# Patient Record
Sex: MALE | Race: WHITE | Employment: FULL TIME | URBAN - NONMETROPOLITAN AREA
[De-identification: names, ages, dates, MRNs, and addresses within clinical notes are randomized per-mention and may not be internally consistent; named-entity substitution may affect disease eponyms.]

---

## 2020-07-28 ENCOUNTER — HOSPITAL ENCOUNTER (EMERGENCY)
Age: 60
Discharge: HOME | End: 2020-07-28
Attending: EMERGENCY MEDICINE
Payer: COMMERCIAL

## 2020-07-28 VITALS
TEMPERATURE: 97.9 F | WEIGHT: 205 LBS | HEIGHT: 72 IN | DIASTOLIC BLOOD PRESSURE: 115 MMHG | RESPIRATION RATE: 18 BRPM | OXYGEN SATURATION: 98 % | HEART RATE: 77 BPM | BODY MASS INDEX: 27.77 KG/M2 | SYSTOLIC BLOOD PRESSURE: 153 MMHG

## 2020-07-28 PROCEDURE — 99282 EMERGENCY DEPT VISIT SF MDM: CPT

## 2020-07-28 PROCEDURE — 4500000027

## 2020-07-28 PROCEDURE — 2500000003 HC RX 250 WO HCPCS: Performed by: EMERGENCY MEDICINE

## 2020-07-28 RX ORDER — DEXLANSOPRAZOLE 60 MG/1
CAPSULE, DELAYED RELEASE ORAL
COMMUNITY

## 2020-07-28 RX ORDER — ICOSAPENT ETHYL 1000 MG/1
CAPSULE ORAL
COMMUNITY

## 2020-07-28 RX ORDER — TOBRAMYCIN 3 MG/ML
SOLUTION/ DROPS OPHTHALMIC
COMMUNITY

## 2020-07-28 RX ORDER — SULFAMETHOXAZOLE AND TRIMETHOPRIM 800; 160 MG/1; MG/1
TABLET ORAL
COMMUNITY

## 2020-07-28 RX ORDER — BUPROPION HYDROCHLORIDE 150 MG/1
TABLET ORAL
COMMUNITY

## 2020-07-28 RX ORDER — LIDOCAINE HYDROCHLORIDE 10 MG/ML
5 INJECTION, SOLUTION INFILTRATION; PERINEURAL ONCE
Status: COMPLETED | OUTPATIENT
Start: 2020-07-28 | End: 2020-07-28

## 2020-07-28 RX ADMIN — LIDOCAINE HYDROCHLORIDE 5 ML: 10 INJECTION, SOLUTION INFILTRATION; PERINEURAL at 19:54

## 2020-07-28 ASSESSMENT — ENCOUNTER SYMPTOMS
EYES NEGATIVE: 1
DIFFICULTY BREATHING: 0
GASTROINTESTINAL NEGATIVE: 1
NAUSEA: 0
BLURRED VISION: 0
DOUBLE VISION: 0
RESPIRATORY NEGATIVE: 1
VOMITING: 0

## 2020-07-28 ASSESSMENT — PAIN DESCRIPTION - PAIN TYPE: TYPE: ACUTE PAIN

## 2020-07-28 ASSESSMENT — PAIN SCALES - GENERAL: PAINLEVEL_OUTOF10: 5

## 2020-07-28 ASSESSMENT — PAIN DESCRIPTION - ORIENTATION: ORIENTATION: LEFT

## 2020-07-28 ASSESSMENT — PAIN DESCRIPTION - LOCATION: LOCATION: HEAD

## 2020-07-29 NOTE — ED PROVIDER NOTES
Susy rubio  The history is provided by the patient. Head Injury   Location:  Frontal  Mechanism of injury: direct blow    Mechanism of injury comment:  Weed eater fell onto his head while in garage and has laceration to forehead  Pain details:     Quality:  Aching and dull    Severity:  Mild    Timing:  Constant  Chronicity:  New  Relieved by:  Nothing  Worsened by:  Nothing  Ineffective treatments:  None tried  Associated symptoms: no blurred vision, no difficulty breathing, no disorientation, no double vision, no focal weakness, no headaches, no hearing loss, no loss of consciousness, no memory loss, no nausea, no neck pain, no numbness, no seizures, no tinnitus and no vomiting        Review of Systems   Constitutional: Negative. HENT: Negative. Negative for hearing loss and tinnitus. Eyes: Negative. Negative for blurred vision and double vision. Respiratory: Negative. Cardiovascular: Negative. Gastrointestinal: Negative. Negative for nausea and vomiting. Genitourinary: Negative. Musculoskeletal: Negative. Negative for neck pain. Skin: Negative. Neurological: Negative. Negative for focal weakness, seizures, loss of consciousness, numbness and headaches. Psychiatric/Behavioral: Negative for memory loss. All other systems reviewed and are negative. History reviewed. No pertinent family history. Social History     Socioeconomic History    Marital status: Single     Spouse name: Not on file    Number of children: Not on file    Years of education: Not on file    Highest education level: Not on file   Occupational History    Not on file   Social Needs    Financial resource strain: Not on file    Food insecurity     Worry: Not on file     Inability: Not on file    Transportation needs     Medical: Not on file     Non-medical: Not on file   Tobacco Use    Smoking status: Current Some Day Smoker   Substance and Sexual Activity    Alcohol use:  Yes    Drug use: No    Sexual activity: Not on file   Lifestyle    Physical activity     Days per week: Not on file     Minutes per session: Not on file    Stress: Not on file   Relationships    Social connections     Talks on phone: Not on file     Gets together: Not on file     Attends Shinto service: Not on file     Active member of club or organization: Not on file     Attends meetings of clubs or organizations: Not on file     Relationship status: Not on file    Intimate partner violence     Fear of current or ex partner: Not on file     Emotionally abused: Not on file     Physically abused: Not on file     Forced sexual activity: Not on file   Other Topics Concern    Not on file   Social History Narrative    Not on file     Past Surgical History:   Procedure Laterality Date    APPENDECTOMY      KNEE SURGERY       History reviewed. No pertinent past medical history. No Known Allergies  Prior to Admission medications    Medication Sig Start Date End Date Taking? Authorizing Provider   buPROPion (WELLBUTRIN XL) 150 MG extended release tablet bupropion HCl  mg 24 hr tablet, extended release    Historical Provider, MD   Icosapent Ethyl (VASCEPA) 1 g CAPS capsule Vascepa 1 gram capsule    Historical Provider, MD   dexlansoprazole (DEXILANT) 60 MG CPDR delayed release capsule Dexilant 60 mg capsule, delayed release   TAKE 1 CAPSULE BY MOUTH EVERY DAY FOR 64 DAYS    Historical Provider, MD   sulfamethoxazole-trimethoprim (BACTRIM DS;SEPTRA DS) 800-160 MG per tablet sulfamethoxazole 800 mg-trimethoprim 160 mg tablet    Historical Provider, MD   tobramycin (TOBREX) 0.3 % ophthalmic solution Tobrex 0.3 % eye drops   instill 2 drops in affected eyes three x day    Historical Provider, MD   ibuprofen (ADVIL;MOTRIN) 600 MG tablet Take 600 mg by mouth every 6 hours as needed for Pain. Historical Provider, MD   naproxen (NAPROSYN) 500 MG tablet Take 1 tablet by mouth 2 times daily as needed for Pain for up to 10 days.  2/4/15 2/14/15 Juliette Reed MD       BP (!) 153/115   Pulse 77   Temp 97.9 °F (36.6 °C) (Temporal)   Resp 18   Ht 6' (1.829 m)   Wt 205 lb (93 kg)   SpO2 98%   BMI 27.80 kg/m²     Physical Exam  Vitals signs and nursing note reviewed. Constitutional:       Appearance: He is well-developed. HENT:      Head: Normocephalic and atraumatic. Comments: Red is three lacerations (see procedure note for size details)     Right Ear: External ear normal.      Left Ear: External ear normal.      Nose: Nose normal.   Eyes:      Conjunctiva/sclera: Conjunctivae normal.      Pupils: Pupils are equal, round, and reactive to light. Neck:      Musculoskeletal: Normal range of motion and neck supple. Cardiovascular:      Rate and Rhythm: Normal rate and regular rhythm. Heart sounds: Normal heart sounds. Pulmonary:      Effort: Pulmonary effort is normal.      Breath sounds: Normal breath sounds. Abdominal:      General: Bowel sounds are normal.      Palpations: Abdomen is soft. Musculoskeletal: Normal range of motion. Skin:     General: Skin is warm and dry. Neurological:      General: No focal deficit present. Mental Status: He is alert and oriented to person, place, and time. GCS: GCS eye subscore is 4. GCS verbal subscore is 5. GCS motor subscore is 6. Cranial Nerves: Cranial nerves are intact. Sensory: Sensation is intact. Motor: Motor function is intact. Coordination: Coordination is intact. Psychiatric:         Behavior: Behavior normal.         Thought Content: Thought content normal.         Judgment: Judgment normal.         MDM:    No results found for this visit on 07/28/20.     [facility]    Date/Time: 7/28/2020 8:32 PM  Performed by: Gilmar Tomas DO  Authorized by: Gilmar Tomas DO     Consent:     Consent obtained:  Verbal    Consent given by:  Patient    Risks discussed:  Infection, need for additional repair, nerve damage, poor wound healing, poor cosmetic result, pain, retained foreign body, tendon damage and vascular damage    Alternatives discussed:  No treatment, delayed treatment, observation and referral  Universal protocol:     Procedure explained and questions answered to patient or proxy's satisfaction: yes      Relevant documents present and verified: yes      Test results available and properly labeled: yes      Imaging studies available: yes      Required blood products, implants, devices, and special equipment available: yes      Site/side marked: yes      Immediately prior to procedure, a time out was called: yes      Patient identity confirmed:  Verbally with patient  Anesthesia (see MAR for exact dosages): Anesthesia method:  Local infiltration    Local anesthetic:  Lidocaine 1% w/o epi  Laceration details:     Location:  Face    Face location:  Forehead    Wound length (cm): 2 cm above left eye brow, 1.5 cm between eyes, 1cm above right eyebrow. Repair type:     Repair type:  Simple  Pre-procedure details:     Preparation:  Patient was prepped and draped in usual sterile fashion  Exploration:     Hemostasis achieved with:  Direct pressure    Wound exploration: entire depth of wound probed and visualized      Contaminated: no    Treatment:     Area cleansed with:  Remigio-Jason    Amount of cleaning:  Extensive    Irrigation solution:  Sterile saline    Irrigation method:  Pressure wash    Visualized foreign bodies/material removed: no    Skin repair:     Repair method:  Sutures    Suture size:  6-0    Suture material:  Fast-absorbing gut    Suture technique:  Running    Number of sutures: All 3 lacerations were closed with same method and type of suture. Steristrips placed after for extra support. Post-procedure details:     Dressing:  Non-adherent dressing    Patient tolerance of procedure:   Tolerated well, no immediate complications          My typical dicussion, presentation,and considerations for this patients' chief complaint,

## 2020-07-29 NOTE — ED NOTES
Patient given AVS, and discharge instructions. Verbalizes understanding no further needs identified at this time.      Aleksandr Patterson RN  07/28/20 2027

## 2022-12-13 ENCOUNTER — HOSPITAL ENCOUNTER (OUTPATIENT)
Age: 62
Discharge: HOME | End: 2022-12-13
Payer: COMMERCIAL

## 2022-12-13 ENCOUNTER — HOSPITAL ENCOUNTER (OUTPATIENT)
Dept: GENERAL RADIOLOGY | Age: 62
Discharge: HOME | End: 2022-12-13
Payer: COMMERCIAL

## 2022-12-13 DIAGNOSIS — J20.9 ACUTE BRONCHITIS, UNSPECIFIED ORGANISM: ICD-10-CM

## 2022-12-13 PROCEDURE — 71046 X-RAY EXAM CHEST 2 VIEWS: CPT

## 2023-01-25 ENCOUNTER — APPOINTMENT (OUTPATIENT)
Dept: GENERAL RADIOLOGY | Age: 63
End: 2023-01-25
Payer: COMMERCIAL

## 2023-01-25 ENCOUNTER — HOSPITAL ENCOUNTER (EMERGENCY)
Age: 63
Discharge: ANOTHER HOSPITAL | End: 2023-01-26
Attending: EMERGENCY MEDICINE
Payer: COMMERCIAL

## 2023-01-25 ENCOUNTER — APPOINTMENT (OUTPATIENT)
Dept: CT IMAGING | Age: 63
End: 2023-01-25
Payer: COMMERCIAL

## 2023-01-25 DIAGNOSIS — E83.42 HYPOMAGNESEMIA: ICD-10-CM

## 2023-01-25 DIAGNOSIS — R50.9 ACUTE FEBRILE ILLNESS: ICD-10-CM

## 2023-01-25 DIAGNOSIS — R07.9 CHEST PAIN, UNSPECIFIED TYPE: Primary | ICD-10-CM

## 2023-01-25 DIAGNOSIS — D69.6 THROMBOCYTOPENIA (HCC): ICD-10-CM

## 2023-01-25 DIAGNOSIS — I21.4 NSTEMI (NON-ST ELEVATED MYOCARDIAL INFARCTION) (HCC): ICD-10-CM

## 2023-01-25 LAB
ADENOVIRUS DETECTION BY PCR: NOT DETECTED
ALBUMIN SERPL-MCNC: 4 GM/DL (ref 3.4–5)
ALP BLD-CCNC: 88 IU/L (ref 40–129)
ALT SERPL-CCNC: 12 U/L (ref 10–40)
ANION GAP SERPL CALCULATED.3IONS-SCNC: 14 MMOL/L (ref 4–16)
AST SERPL-CCNC: 20 IU/L (ref 15–37)
BANDED NEUTROPHILS ABSOLUTE COUNT: 0.2 K/CU MM
BANDED NEUTROPHILS RELATIVE PERCENT: 3 % (ref 5–11)
BILIRUB SERPL-MCNC: 0.8 MG/DL (ref 0–1)
BORDETELLA PARAPERTUSSIS BY PCR: NOT DETECTED
BORDETELLA PERTUSSIS PCR: NOT DETECTED
BUN BLDV-MCNC: 17 MG/DL (ref 6–23)
CALCIUM SERPL-MCNC: 9.4 MG/DL (ref 8.3–10.6)
CHLAMYDOPHILA PNEUMONIA PCR: NOT DETECTED
CHLORIDE BLD-SCNC: 96 MMOL/L (ref 99–110)
CO2: 24 MMOL/L (ref 21–32)
CORONAVIRUS 229E PCR: NOT DETECTED
CORONAVIRUS HKU1 PCR: NOT DETECTED
CORONAVIRUS NL63 PCR: NOT DETECTED
CORONAVIRUS OC43 PCR: NOT DETECTED
CREAT SERPL-MCNC: 1 MG/DL (ref 0.9–1.3)
DIFFERENTIAL TYPE: ABNORMAL
GFR SERPL CREATININE-BSD FRML MDRD: >60 ML/MIN/1.73M2
GLUCOSE BLD-MCNC: 101 MG/DL (ref 70–99)
HCT VFR BLD CALC: 29 % (ref 42–52)
HEMOGLOBIN: 9.5 GM/DL (ref 13.5–18)
HUMAN METAPNEUMOVIRUS PCR: NOT DETECTED
INFLUENZA A BY PCR: NOT DETECTED
INFLUENZA A H1 (2009) PCR: NOT DETECTED
INFLUENZA A H1 PANDEMIC PCR: NOT DETECTED
INFLUENZA A H3 PCR: NOT DETECTED
INFLUENZA B BY PCR: NOT DETECTED
LACTIC ACID, SEPSIS: 1.3 MMOL/L (ref 0.5–1.9)
LYMPHOCYTES ABSOLUTE: 2.4 K/CU MM
LYMPHOCYTES RELATIVE PERCENT: 36 % (ref 24–44)
MAGNESIUM: 1.6 MG/DL (ref 1.8–2.4)
MCH RBC QN AUTO: 30.1 PG (ref 27–31)
MCHC RBC AUTO-ENTMCNC: 32.8 % (ref 32–36)
MCV RBC AUTO: 91.8 FL (ref 78–100)
METAMYELOCYTES ABSOLUTE COUNT: 0.13 K/CU MM
METAMYELOCYTES PERCENT: 2 %
MONOCYTES ABSOLUTE: 3.4 K/CU MM
MONOCYTES RELATIVE PERCENT: 50 % (ref 0–4)
MYCOPLASMA PNEUMONIAE PCR: NOT DETECTED
MYELOCYTE PERCENT: 3 %
MYELOCYTES ABSOLUTE COUNT: 0.2 K/CU MM
OVALOCYTES: ABNORMAL
PARAINFLUENZA 1 PCR: NOT DETECTED
PARAINFLUENZA 2 PCR: NOT DETECTED
PARAINFLUENZA 3 PCR: NOT DETECTED
PARAINFLUENZA 4 PCR: NOT DETECTED
PDW BLD-RTO: 17.9 % (ref 11.7–14.9)
PLATELET # BLD: 14 K/CU MM (ref 140–440)
PMV BLD AUTO: ABNORMAL FL (ref 7.5–11.1)
POTASSIUM SERPL-SCNC: 4.1 MMOL/L (ref 3.5–5.1)
PRO-BNP: 1459 PG/ML
RBC # BLD: 3.16 M/CU MM (ref 4.6–6.2)
RHINOVIRUS ENTEROVIRUS PCR: NOT DETECTED
RSV PCR: NOT DETECTED
SARS-COV-2: NOT DETECTED
SCHISTOCYTES: ABNORMAL
SEGMENTED NEUTROPHILS ABSOLUTE COUNT: 0.4 K/CU MM
SEGMENTED NEUTROPHILS RELATIVE PERCENT: 6 % (ref 36–66)
SODIUM BLD-SCNC: 134 MMOL/L (ref 135–145)
TARGET CELLS: ABNORMAL
TEAR DROP CELLS: ABNORMAL
TOTAL PROTEIN: 7.3 GM/DL (ref 6.4–8.2)
TROPONIN T: 0.04 NG/ML
TROPONIN T: 0.06 NG/ML
TSH HIGH SENSITIVITY: 0.69 UIU/ML (ref 0.27–4.2)
WBC # BLD: 6.7 K/CU MM (ref 4–10.5)

## 2023-01-25 PROCEDURE — 99285 EMERGENCY DEPT VISIT HI MDM: CPT

## 2023-01-25 PROCEDURE — 71045 X-RAY EXAM CHEST 1 VIEW: CPT

## 2023-01-25 PROCEDURE — 93005 ELECTROCARDIOGRAM TRACING: CPT | Performed by: EMERGENCY MEDICINE

## 2023-01-25 PROCEDURE — 6360000004 HC RX CONTRAST MEDICATION: Performed by: EMERGENCY MEDICINE

## 2023-01-25 PROCEDURE — 0202U NFCT DS 22 TRGT SARS-COV-2: CPT

## 2023-01-25 PROCEDURE — 2580000003 HC RX 258: Performed by: EMERGENCY MEDICINE

## 2023-01-25 PROCEDURE — 87040 BLOOD CULTURE FOR BACTERIA: CPT

## 2023-01-25 PROCEDURE — 6360000002 HC RX W HCPCS: Performed by: EMERGENCY MEDICINE

## 2023-01-25 PROCEDURE — 84443 ASSAY THYROID STIM HORMONE: CPT

## 2023-01-25 PROCEDURE — 6370000000 HC RX 637 (ALT 250 FOR IP): Performed by: EMERGENCY MEDICINE

## 2023-01-25 PROCEDURE — 84484 ASSAY OF TROPONIN QUANT: CPT

## 2023-01-25 PROCEDURE — 83605 ASSAY OF LACTIC ACID: CPT

## 2023-01-25 PROCEDURE — 80053 COMPREHEN METABOLIC PANEL: CPT

## 2023-01-25 PROCEDURE — 96376 TX/PRO/DX INJ SAME DRUG ADON: CPT

## 2023-01-25 PROCEDURE — 71275 CT ANGIOGRAPHY CHEST: CPT

## 2023-01-25 PROCEDURE — 83735 ASSAY OF MAGNESIUM: CPT

## 2023-01-25 PROCEDURE — 85027 COMPLETE CBC AUTOMATED: CPT

## 2023-01-25 PROCEDURE — 83880 ASSAY OF NATRIURETIC PEPTIDE: CPT

## 2023-01-25 PROCEDURE — 96365 THER/PROPH/DIAG IV INF INIT: CPT

## 2023-01-25 PROCEDURE — 85007 BL SMEAR W/DIFF WBC COUNT: CPT

## 2023-01-25 PROCEDURE — 96361 HYDRATE IV INFUSION ADD-ON: CPT

## 2023-01-25 RX ORDER — KETOROLAC TROMETHAMINE 15 MG/ML
INJECTION, SOLUTION INTRAMUSCULAR; INTRAVENOUS
Status: DISCONTINUED
Start: 2023-01-25 | End: 2023-01-26 | Stop reason: HOSPADM

## 2023-01-25 RX ORDER — ONDANSETRON 2 MG/ML
4 INJECTION INTRAMUSCULAR; INTRAVENOUS EVERY 30 MIN PRN
Status: DISCONTINUED | OUTPATIENT
Start: 2023-01-25 | End: 2023-01-26 | Stop reason: HOSPADM

## 2023-01-25 RX ORDER — ACETAMINOPHEN 500 MG
1000 TABLET ORAL ONCE
Status: COMPLETED | OUTPATIENT
Start: 2023-01-25 | End: 2023-01-25

## 2023-01-25 RX ORDER — MAGNESIUM SULFATE IN WATER 40 MG/ML
2000 INJECTION, SOLUTION INTRAVENOUS ONCE
Status: COMPLETED | OUTPATIENT
Start: 2023-01-25 | End: 2023-01-25

## 2023-01-25 RX ORDER — OMEPRAZOLE 20 MG/1
CAPSULE, DELAYED RELEASE ORAL
COMMUNITY
Start: 2023-01-11

## 2023-01-25 RX ORDER — NITROGLYCERIN 0.4 MG/1
TABLET SUBLINGUAL
Status: DISCONTINUED
Start: 2023-01-25 | End: 2023-01-26 | Stop reason: HOSPADM

## 2023-01-25 RX ORDER — OMEGA-3-ACID ETHYL ESTERS 1 G/1
CAPSULE, LIQUID FILLED ORAL
COMMUNITY
Start: 2023-01-12

## 2023-01-25 RX ORDER — 0.9 % SODIUM CHLORIDE 0.9 %
1000 INTRAVENOUS SOLUTION INTRAVENOUS ONCE
Status: COMPLETED | OUTPATIENT
Start: 2023-01-25 | End: 2023-01-25

## 2023-01-25 RX ORDER — ASPIRIN 81 MG/1
324 TABLET, CHEWABLE ORAL ONCE
Status: COMPLETED | OUTPATIENT
Start: 2023-01-25 | End: 2023-01-25

## 2023-01-25 RX ORDER — MORPHINE SULFATE 4 MG/ML
4 INJECTION, SOLUTION INTRAMUSCULAR; INTRAVENOUS EVERY 30 MIN PRN
Status: DISCONTINUED | OUTPATIENT
Start: 2023-01-25 | End: 2023-01-26 | Stop reason: HOSPADM

## 2023-01-25 RX ORDER — NITROGLYCERIN 0.4 MG/1
0.4 TABLET SUBLINGUAL EVERY 5 MIN PRN
Status: DISCONTINUED | OUTPATIENT
Start: 2023-01-25 | End: 2023-01-26 | Stop reason: HOSPADM

## 2023-01-25 RX ORDER — KETOROLAC TROMETHAMINE 15 MG/ML
15 INJECTION, SOLUTION INTRAMUSCULAR; INTRAVENOUS ONCE
Status: COMPLETED | OUTPATIENT
Start: 2023-01-25 | End: 2023-01-25

## 2023-01-25 RX ORDER — ACETAMINOPHEN 500 MG
TABLET ORAL
Status: DISCONTINUED
Start: 2023-01-25 | End: 2023-01-26 | Stop reason: HOSPADM

## 2023-01-25 RX ORDER — SODIUM CHLORIDE 9 MG/ML
INJECTION, SOLUTION INTRAVENOUS CONTINUOUS
Status: DISCONTINUED | OUTPATIENT
Start: 2023-01-25 | End: 2023-01-26 | Stop reason: HOSPADM

## 2023-01-25 RX ADMIN — IOPAMIDOL 75 ML: 755 INJECTION, SOLUTION INTRAVENOUS at 20:19

## 2023-01-25 RX ADMIN — NITROGLYCERIN 0.4 MG: 0.4 TABLET SUBLINGUAL at 18:52

## 2023-01-25 RX ADMIN — SODIUM CHLORIDE 1000 ML: 9 INJECTION, SOLUTION INTRAVENOUS at 19:43

## 2023-01-25 RX ADMIN — ACETAMINOPHEN 1000 MG: 500 TABLET ORAL at 18:51

## 2023-01-25 RX ADMIN — KETOROLAC TROMETHAMINE 15 MG: 15 INJECTION, SOLUTION INTRAMUSCULAR; INTRAVENOUS at 18:53

## 2023-01-25 RX ADMIN — SODIUM CHLORIDE: 9 INJECTION, SOLUTION INTRAVENOUS at 23:16

## 2023-01-25 RX ADMIN — ASPIRIN 81 MG CHEWABLE TABLET 324 MG: 81 TABLET CHEWABLE at 23:08

## 2023-01-25 RX ADMIN — MAGNESIUM SULFATE HEPTAHYDRATE 2000 MG: 40 INJECTION, SOLUTION INTRAVENOUS at 19:47

## 2023-01-25 ASSESSMENT — PAIN - FUNCTIONAL ASSESSMENT
PAIN_FUNCTIONAL_ASSESSMENT: 0-10
PAIN_FUNCTIONAL_ASSESSMENT: PREVENTS OR INTERFERES WITH MANY ACTIVE NOT PASSIVE ACTIVITIES

## 2023-01-25 ASSESSMENT — PAIN SCALES - GENERAL
PAINLEVEL_OUTOF10: 10
PAINLEVEL_OUTOF10: 9
PAINLEVEL_OUTOF10: 10
PAINLEVEL_OUTOF10: 2
PAINLEVEL_OUTOF10: 0

## 2023-01-25 ASSESSMENT — PAIN DESCRIPTION - DESCRIPTORS: DESCRIPTORS: PRESSURE

## 2023-01-25 ASSESSMENT — PAIN DESCRIPTION - LOCATION
LOCATION: CHEST

## 2023-01-25 ASSESSMENT — PAIN DESCRIPTION - PAIN TYPE: TYPE: ACUTE PAIN

## 2023-01-25 ASSESSMENT — PAIN DESCRIPTION - ORIENTATION
ORIENTATION: MID
ORIENTATION: MID

## 2023-01-25 ASSESSMENT — PAIN DESCRIPTION - FREQUENCY: FREQUENCY: CONTINUOUS

## 2023-01-25 NOTE — ED PROVIDER NOTES
Emergency Department Encounter  Location: [facility] [address] [place] [address]    Patient: Ronnell Chaidez  MRN: [id number]  : 1960  Date of evaluation: 2023  ED Provider: Manda Barragan DO, FACEP    Chief Complaint:    Fever ( has had pressure in his chest since this morning. States pain is getting progressively worse. States took ibuprofen at 0730 this morning. States has been nauseated and vomiting once. States has been very SOB. ) and Chest Pain    [place]:  Ronnell Chaidez is a 58 y.o. male that presents to the emergency department with complaints of pressure in his chest that started this morning when he awoke. He states that has been getting progressively worse through the day. He took ibuprofen at 7:30 AM.  He has been nauseated and has vomited once. He has been short of breath associated with it. He denies headache. He is having pain in the center of his chest that seems to be worsening as the day is gone on. He left work early and went home. He states when he got home he laid down and took a nap. When he woke up about 430 the pain was twice as bad as it was previously. Patient has no previous history of heart problems. He states he has a family history of cardiac disease. He is a non-smoker with no history of diabetes hypertension or high cholesterol. Upon arrival patient has a temperature 1-2.4 he was not aware. He denies cough. He denies diarrhea. ROS - see HPI, below listed is current ROS at time of my eval:  At least 10 systems reviewed and otherwise acutely negative except as in the [address]   General:  No fevers, no chills, no weakness  Eyes:  No recent vison changes, no discharge  ENT:  No sore throat, no nasal congestion, no hearing changes  Cardiovascular: Positive for chest pain, no palpitations  Respiratory: Positive for shortness of breath, no cough, no wheezing  Gastrointestinal:  No pain, positive for nausea 1 episode of vomiting, no diarrhea  Musculoskeletal:  No muscle pain, no joint pain  Skin:  No rash, no pruritis, no easy bruising  Neurologic:  No speech problems, no headache, no extremity numbness, no extremity tingling, no extremity weakness  Psychiatric:  No anxiety  Genitourinary:  No dysuria, no hematuria  Endocrine:  No unexpected weight gain, no unexpected weight loss  Extremities:  no edema, no pain    History reviewed. No pertinent past medical history. Past Surgical History:   Procedure Laterality Date    ANKLE SURGERY Left     APPENDECTOMY      KNEE SURGERY       History reviewed. No pertinent family history. Social History     Socioeconomic History    Marital status: Single     Spouse name: Not on file    Number of children: Not on file    Years of education: Not on file    Highest education level: Not on file   Occupational History    Not on file   Tobacco Use    Smoking status: Former     Types: Cigarettes    Smokeless tobacco: Not on file   Vaping Use    Vaping Use: Never used   Substance and Sexual Activity    Alcohol use:  Yes     Alcohol/week: 4.0 standard drinks     Types: 2 Glasses of wine, 2 Shots of liquor per week     Comment: Weekends    Drug use: No    Sexual activity: Not on file   Other Topics Concern    Not on file   Social History Narrative    Not on file     Social Determinants of Health     Financial Resource Strain: Not on file   Food Insecurity: Not on file   Transportation Needs: Not on file   Physical Activity: Not on file   Stress: Not on file   Social Connections: Not on file   Intimate Partner Violence: Not on file   Housing Stability: Not on file     Current Facility-Administered Medications   Medication Dose Route Frequency Provider Last Rate Last Admin    nitroGLYCERIN (NITROSTAT) SL tablet 0.4 mg  0.4 mg SubLINGual Q5 Min PRN Goyo Martínez DO   0.4 mg at 01/25/23 4902    ondansetron (ZOFRAN) injection 4 mg  4 mg IntraVENous Q30 Min PRN Goyo Martínez DO        morphine sulfate (PF) injection 4 mg  4 mg IntraVENous Q30 Min PRN Austin Oreilly, DO        0.9 % sodium chloride infusion   IntraVENous Continuous Austin Oreilly, DO        aspirin chewable tablet 324 mg  324 mg Oral Once Austin Oreilly, DO         Current Outpatient Medications   Medication Sig Dispense Refill    omega-3 acid ethyl esters (LOVAZA) 1 g capsule TAKE 2 CAPSULES BY MOUTH TWICE A DAY      omeprazole (PRILOSEC) 20 MG delayed release capsule TAKE 1 CAPSULE BY MOUTH EVERY DAY      buPROPion (WELLBUTRIN XL) 150 MG extended release tablet bupropion HCl  mg 24 hr tablet, extended release (Patient not taking: Reported on 1/25/2023)      Icosapent Ethyl (VASCEPA) 1 g CAPS capsule Vascepa 1 gram capsule      dexlansoprazole (DEXILANT) 60 MG CPDR delayed release capsule Dexilant 60 mg capsule, delayed release   TAKE 1 CAPSULE BY MOUTH EVERY DAY FOR 56 DAYS (Patient not taking: Reported on 1/25/2023)      sulfamethoxazole-trimethoprim (BACTRIM DS;SEPTRA DS) 800-160 MG per tablet sulfamethoxazole 800 mg-trimethoprim 160 mg tablet (Patient not taking: Reported on 1/25/2023)      tobramycin (TOBREX) 0.3 % ophthalmic solution Tobrex 0.3 % eye drops   instill 2 drops in affected eyes three x day (Patient not taking: Reported on 1/25/2023)      ibuprofen (ADVIL;MOTRIN) 600 MG tablet Take 600 mg by mouth every 6 hours as needed for Pain. naproxen (NAPROSYN) 500 MG tablet Take 1 tablet by mouth 2 times daily as needed for Pain for up to 10 days. 20 tablet 0     No Known Allergies    Nursing Notes Reviewed    Physical Exam:  ED Triage Vitals [01/25/23 1807]   Enc Vitals Group      BP (!) 150/75      Heart Rate (!) 133      Resp 22      Temp (!) 102.4 °F (39.1 °C)      Temp Source Oral      SpO2 97 %      Weight 201 lb (91.2 kg)      Height 6' (1.829 m)      Head Circumference       Peak Flow       Pain Score       Pain Loc       Pain Edu? Excl. in [address]? GENERAL APPEARANCE: Awake and alert. Cooperative. No acute distress. Nontoxic in appearance  HEAD: Normocephalic. Atraumatic. EYES: EOM's grossly intact. Sclera anicteric. ENT: Tolerates saliva. No trismus. NECK: Supple. Trachea midline. CARDIO: RRR. Radial pulse 2+. Chest wall is tender to palpation in the substernal region. LUNGS: Respirations unlabored. CTAB. Deep breaths make the pain worse. No wheezes rales or rhonchi  ABDOMEN: Soft. Non-distended. Non-tender. EXTREMITIES: No acute deformities. No edema in lower extremities no pain to his calves  SKIN: Warm and dry. NEUROLOGICAL: No gross facial drooping. Moves all 4 extremities spontaneously. PSYCHIATRIC: Normal mood.      Labs:  Results for orders placed or performed during the hospital encounter of 01/25/23   Respiratory Panel, Molecular, with COVID-19 (Restricted: peds pts or suitable admitted adults)    Specimen: Nasopharyngeal   Result Value Ref Range    Adenovirus Detection by PCR NOT DETECTED NOT DETECTED    Coronavirus 229E PCR NOT DETECTED NOT DETECTED    Coronavirus HKU1 PCR NOT DETECTED NOT DETECTED    Coronavirus NL63 PCR NOT DETECTED NOT DETECTED    Coronavirus OC43 PCR NOT DETECTED NOT DETECTED    SARS-CoV-2 NOT DETECTED NOT DETECTED    Human Metapneumovirus PCR NOT DETECTED NOT DETECTED    Rhinovirus Enterovirus PCR NOT DETECTED NOT DETECTED    Influenza A by PCR NOT DETECTED NOT DETECTED    Influenza A H1 Pandemic PCR NOT DETECTED NOT DETECTED    Influenza A H1 (2009) PCR NOT DETECTED NOT DETECTED    Influenza A H3 PCR NOT DETECTED NOT DETECTED    Influenza B by PCR NOT DETECTED NOT DETECTED    Parainfluenza 1 PCR NOT DETECTED NOT DETECTED    Parainfluenza 2 PCR NOT DETECTED NOT DETECTED    Parainfluenza 3 PCR NOT DETECTED NOT DETECTED    Parainfluenza 4 PCR NOT DETECTED NOT DETECTED    RSV PCR NOT DETECTED NOT DETECTED    Bordetella parapertussis by PCR NOT DETECTED NOT DETECTED    B Pertussis by PCR NOT DETECTED NOT DETECTED    Chlamydophila Pneumonia PCR NOT DETECTED NOT DETECTED    Mycoplasma pneumo by PCR NOT DETECTED NOT DETECTED Brain Natriuretic Peptide   Result Value Ref Range    Pro-BNP 1,459 (H) <300 PG/ML   CBC with Auto Differential   Result Value Ref Range    WBC 6.7 4.0 - 10.5 K/CU MM    RBC 3.16 (L) 4.6 - 6.2 M/CU MM    Hemoglobin 9.5 (L) 13.5 - 18.0 GM/DL    Hematocrit 29.0 (L) 42 - 52 %    MCV 91.8 78 - 100 FL    MCH 30.1 27 - 31 PG    MCHC 32.8 32.0 - 36.0 %    RDW 17.9 (H) 11.7 - 14.9 %    Platelets 14 (LL) 208 - 440 K/CU MM    MPV ABNORMAL 7.5 - 11.1 FL    Myelocyte Percent 3 (H) 0.0 %    Metamyelocytes Relative 2 (H) 0.0 %    Bands Relative 3 (L) 5 - 11 %    Segs Relative 6.0 (L) 36 - 66 %    Lymphocytes % 36.0 24 - 44 %    Monocytes % 50.0 (H) 0 - 4 %    Myelocytes Absolute 0.20 K/CU MM    Metamyelocytes Absolute 0.13 K/CU MM    Bands Absolute 0.20 K/CU MM    Segs Absolute 0.4 K/CU MM    Lymphocytes Absolute 2.4 K/CU MM    Monocytes Absolute 3.4 K/CU MM    Differential Type MANUAL DIFFERENTIAL     Target Cells 1+     Tear Drop Cells 1+     Ovalocytes 2+     Schistocytes 1+    Comprehensive Metabolic Panel   Result Value Ref Range    Sodium 134 (L) 135 - 145 MMOL/L    Potassium 4.1 3.5 - 5.1 MMOL/L    Chloride 96 (L) 99 - 110 mMol/L    CO2 24 21 - 32 MMOL/L    BUN 17 6 - 23 MG/DL    Creatinine 1.0 0.9 - 1.3 MG/DL    Est, Glom Filt Rate >60 >60 mL/min/1.73m2    Glucose 101 (H) 70 - 99 MG/DL    Calcium 9.4 8.3 - 10.6 MG/DL    Albumin 4.0 3.4 - 5.0 GM/DL    Total Protein 7.3 6.4 - 8.2 GM/DL    Total Bilirubin 0.8 0.0 - 1.0 MG/DL    ALT 12 10 - 40 U/L    AST 20 15 - 37 IU/L    Alkaline Phosphatase 88 40 - 129 IU/L    Anion Gap 14 4 - 16   Troponin   Result Value Ref Range    Troponin T 0.039 (H) <0.01 NG/ML   Lactate, Sepsis   Result Value Ref Range    Lactic Acid, Sepsis 1.3 0.5 - 1.9 mMOL/L   Magnesium   Result Value Ref Range    Magnesium 1.6 (L) 1.8 - 2.4 mg/dl   TSH   Result Value Ref Range    TSH, High Sensitivity 0.686 0.270 - 4.20 uIu/ml   Troponin   Result Value Ref Range    Troponin T 0.055 (H) <0.01 NG/ML   EKG 12 Lead   Result Value Ref Range    Ventricular Rate 100 BPM    Atrial Rate 100 BPM    P-R Interval 158 ms    QRS Duration 90 ms    Q-T Interval 348 ms    QTc Calculation (Bazett) 448 ms    P Axis 49 degrees    R Axis -16 degrees    T Axis 22 degrees    Diagnosis       Sinus rhythm with premature atrial complexes  Otherwise normal ECG  When compared with ECG of 25-JAN-2023 18:23,  premature atrial complexes are now present         EKG (if obtained): (All EKG's are interpreted by myself in the absence of a cardiologist)  The Ekg interpreted by me in the absence of a cardiologist shows. sinus tachycardia, qesc=208  with a rate of 133  Axis is   Normal  QTc is   416  Intervals and Durations are unremarkable. No specific ST-T wave changes appreciated. No evidence of acute ischemia. No prior EKG is available for comparison    The Ekg interpreted by me in the absence of a cardiologist shows. normal sinus rhythm with a rate of 100, premature atrial complexes seen  Axis is   Normal  QTc is   448  Intervals and Durations are unremarkable. No specific ST-T wave changes appreciated. No evidence of acute ischemia. No significant change from prior EKG dated 25 January 2023 except for rate    The Ekg interpreted by me in the absence of a cardiologist shows. At 03 21  normal sinus rhythm with a rate of 105  Axis is   Normal  QTc is   444  Intervals and Durations are unremarkable. No specific ST-T wave changes appreciated. J-point elevation is noted and similar to previous EKG earlier tonight  No evidence of acute ischemia. No significant change from prior EKG dated 25 January 2023    Radiographs (if obtained):  [] The following radiograph was interpreted by myself in the absence of a radiologist:  [x] Radiologist's Report reviewed at time of ED visit:  CTA PULMONARY W CONTRAST   Final Result   No pulmonary embolism or acute pulmonary abnormality.          XR CHEST PORTABLE   Final Result   No acute abnormality. ED Course and MDM:  This patient presents to the emergency department with complaints of chest pain this been ongoing all day. Upon arrival to the ER the patient's heart rate was in the 130s but he had a fever of 102.4. He was given Tylenol and Toradol and his chest pain has improved and his fever has resolved. His heart rate is now 98 and his blood pressure is stable. The patient had associated shortness of breath with this. His pain was worse when he took a deep breath. He states was worse when he got up and moved around. He did get nitro here in the ER which did seem to help his pain as well and he is virtually pain-free at the time of reevaluation. Repeat EKG shows no ST elevation. The patient's troponins are elevated the first was 0.03 eighth and second was 0.055. I discussed his care with Dr. Sanjeev Meza who is on-call for cardiology. He advised me to start aspirin in spite of the patient's platelet count being 14,000. Its not clear as to the exact etiology of his fever. There is no evidence of pneumonia on his x-ray or a CT scan. There is no evidence of PE. His respiratory panel was negative for all the viruses that can be tested. Blood cultures are pending. The patient has no previous cardiac history but does have a family history of cardiac disease. He is a non-smoker with no history of high blood pressure or elevated cholesterol. He does have a known thyroid nodule in his right thyroid lobe that he found out about this morning. His primary care doctor called him with those results. He states he is scheduled to have a biopsy on Friday. The patient will be transferred to [facility] for cardiology evaluation and consult. The hospitalist has been paged. CC/HPI Summary, DDx, ED Course, and Reassessment: Considered influenza, COVID, other viruses, pneumonia, PE, non-STEMI, STEMI, and sepsis.   The patient's lactate is normal.    History from : Patient    Limitations to history : None    Patient was given the following medications:  Medications   nitroGLYCERIN (NITROSTAT) SL tablet 0.4 mg (0.4 mg SubLINGual Given 1/25/23 1852)   ondansetron (ZOFRAN) injection 4 mg (has no administration in time range)   morphine sulfate (PF) injection 4 mg (has no administration in time range)   0.9 % sodium chloride infusion (has no administration in time range)   aspirin chewable tablet 324 mg (has no administration in time range)   acetaminophen (TYLENOL) tablet 1,000 mg (1,000 mg Oral Given 1/25/23 1851)   ketorolac (TORADOL) injection 15 mg (15 mg IntraVENous Given 1/25/23 1853)   0.9 % sodium chloride bolus (0 mLs IntraVENous Stopped 1/25/23 2122)   magnesium sulfate 2000 mg in 50 mL IVPB premix (0 mg IntraVENous Stopped 1/25/23 2113)   iopamidol (ISOVUE-370) 76 % injection 75 mL (75 mLs IntraVENous Given 1/25/23 2019)       Independent Imaging Interpretation by me: None    EKG (if obtained): (All EKG's are interpreted by myself in the absence of a cardiologist) 2 EKGs are interpreted as above    Chronic conditions affecting care: None    Discussion with Other Profesionals : Consultant Dr. Grace Flynn, Dr. Bigg Teague    Social Determinants : None    Records Reviewed : None    Disposition Considerations (tests considered but not done, Shared Decision Making, Pt Expectation of Test or Tx.): Patient's case was discussed with the hospitalist in [facility] Dr. Bigg Teague and he has agreed to admit this patient to the hospitalist service. The patient will be classified as a non-STEMI however I think is more likely that he is having pericarditis. If not clear as to his thrombocytopenia. He had no problems with previous low platelet count which he is aware. Case was discussed with Dr. Grace Flynn cardiologist on-call and he advised me to start aspirin but do not start heparin on this patient. He is relatively pain-free since he received Toradol and has remained stable through his ER course.   His heart rate has come down to 98 his O2 sat is 91% his current blood pressure is 104/69. Patient will be transferred to [facility] once a bed is available and unit is available to take him to [facility]. He is feeling much better after treatment. Patient will be transferred with a diagnosis of non-STEMI secondary to his elevated troponin. I see no evidence of elevated ST segments on his EKG that would indicate an acute ST elevation MI. Cardiology will see him in the morning. I am the Primary Clinician of Record. Final Impression:  1. Chest pain, unspecified type    2. NSTEMI (non-ST elevated myocardial infarction) ([facility] [address]    3. Acute febrile illness    4. Thrombocytopenia ([facility] [address]    5. Hypomagnesemia      DISPOSITION Decision To Transfer    Patient referred to: No follow-up provider specified.   Discharge medications:  New Prescriptions    No medications on file     (Please note that portions of this note may have been completed with a voice recognition program. Efforts were made to edit the dictations but occasionally words are mis-transcribed.)    Sacha Villatoro DO, [facility]  Board certified in [address], [place]  01/25/23 Bertha Barrera DO  01/26/23 9818

## 2023-01-26 ENCOUNTER — HOSPITAL ENCOUNTER (INPATIENT)
Age: 63
LOS: 3 days | Discharge: HOME | DRG: 281 | End: 2023-01-29
Attending: STUDENT IN AN ORGANIZED HEALTH CARE EDUCATION/TRAINING PROGRAM | Admitting: STUDENT IN AN ORGANIZED HEALTH CARE EDUCATION/TRAINING PROGRAM
Payer: COMMERCIAL

## 2023-01-26 VITALS
DIASTOLIC BLOOD PRESSURE: 87 MMHG | HEIGHT: 72 IN | RESPIRATION RATE: 18 BRPM | BODY MASS INDEX: 27.22 KG/M2 | WEIGHT: 201 LBS | OXYGEN SATURATION: 100 % | HEART RATE: 99 BPM | TEMPERATURE: 98.3 F | SYSTOLIC BLOOD PRESSURE: 143 MMHG

## 2023-01-26 PROBLEM — R07.2 PRECORDIAL PAIN: Status: ACTIVE | Noted: 2023-01-26

## 2023-01-26 PROBLEM — I21.4 NSTEMI (NON-ST ELEVATED MYOCARDIAL INFARCTION) (HCC): Status: ACTIVE | Noted: 2023-01-26

## 2023-01-26 LAB
ANION GAP SERPL CALCULATED.3IONS-SCNC: 9 MMOL/L (ref 4–16)
BACTERIA: ABNORMAL /HPF
BILIRUBIN URINE: NEGATIVE MG/DL
BLOOD, URINE: ABNORMAL
BUN BLDV-MCNC: 18 MG/DL (ref 6–23)
CALCIUM SERPL-MCNC: 8.6 MG/DL (ref 8.3–10.6)
CAST TYPE: ABNORMAL /HPF
CHLORIDE BLD-SCNC: 103 MMOL/L (ref 99–110)
CHOLESTEROL: 109 MG/DL
CLARITY: CLEAR
CO2: 22 MMOL/L (ref 21–32)
COLOR: YELLOW
CREAT SERPL-MCNC: 0.8 MG/DL (ref 0.9–1.3)
CRYSTAL TYPE: NEGATIVE /HPF
DIRECT COOMBS: NEGATIVE
EKG ATRIAL RATE: 100 BPM
EKG ATRIAL RATE: 105 BPM
EKG ATRIAL RATE: 91 BPM
EKG DIAGNOSIS: NORMAL
EKG P AXIS: 49 DEGREES
EKG P AXIS: 51 DEGREES
EKG P AXIS: 62 DEGREES
EKG P-R INTERVAL: 154 MS
EKG P-R INTERVAL: 158 MS
EKG P-R INTERVAL: 162 MS
EKG Q-T INTERVAL: 336 MS
EKG Q-T INTERVAL: 348 MS
EKG Q-T INTERVAL: 368 MS
EKG QRS DURATION: 90 MS
EKG QRS DURATION: 92 MS
EKG QRS DURATION: 94 MS
EKG QTC CALCULATION (BAZETT): 444 MS
EKG QTC CALCULATION (BAZETT): 448 MS
EKG QTC CALCULATION (BAZETT): 452 MS
EKG R AXIS: -11 DEGREES
EKG R AXIS: -16 DEGREES
EKG R AXIS: -9 DEGREES
EKG T AXIS: 22 DEGREES
EKG T AXIS: 28 DEGREES
EKG T AXIS: 37 DEGREES
EKG VENTRICULAR RATE: 100 BPM
EKG VENTRICULAR RATE: 105 BPM
EKG VENTRICULAR RATE: 91 BPM
EPITHELIAL CELLS, UA: 1 /HPF
ESTIMATED AVERAGE GLUCOSE: 134 MG/DL
GFR SERPL CREATININE-BSD FRML MDRD: >60 ML/MIN/1.73M2
GLUCOSE BLD-MCNC: 90 MG/DL (ref 70–99)
GLUCOSE, URINE: NEGATIVE MG/DL
HBA1C MFR BLD: 6.3 % (ref 4.2–6.3)
HCT VFR BLD CALC: 25.1 % (ref 42–52)
HDLC SERPL-MCNC: 32 MG/DL
HEMOGLOBIN: 8.2 GM/DL (ref 13.5–18)
KETONES, URINE: NEGATIVE MG/DL
LDL CHOLESTEROL CALCULATED: 61 MG/DL
LEUKOCYTE ESTERASE, URINE: NEGATIVE
LV EF: 60 %
LV EF: 68 %
LVEF MODALITY: NORMAL
LVEF MODALITY: NORMAL
MAGNESIUM: 2.4 MG/DL (ref 1.8–2.4)
MCH RBC QN AUTO: 30.5 PG (ref 27–31)
MCHC RBC AUTO-ENTMCNC: 32.7 % (ref 32–36)
MCV RBC AUTO: 93.3 FL (ref 78–100)
NITRITE URINE, QUANTITATIVE: NEGATIVE
PDW BLD-RTO: 18.1 % (ref 11.7–14.9)
PH, URINE: 5.5 (ref 5–8)
PHOSPHORUS: 3.1 MG/DL (ref 2.5–4.9)
PLATELET # BLD: 11 K/CU MM (ref 140–440)
POTASSIUM SERPL-SCNC: 3.7 MMOL/L (ref 3.5–5.1)
PROTEIN UA: NEGATIVE MG/DL
RBC # BLD: 2.69 M/CU MM (ref 4.6–6.2)
RBC URINE: 0 /HPF (ref 0–3)
RETICULOCYTE COUNT PCT: 0.8 % (ref 0.2–2.2)
SODIUM BLD-SCNC: 134 MMOL/L (ref 135–145)
SPECIFIC GRAVITY UA: 1.01 (ref 1–1.03)
TRIGL SERPL-MCNC: 80 MG/DL
TROPONIN T: 0.03 NG/ML
UROBILINOGEN, URINE: 0.2 MG/DL (ref 0.2–1)
WBC # BLD: 5.4 K/CU MM (ref 4–10.5)
WBC UA: <0 /HPF (ref 0–2)

## 2023-01-26 PROCEDURE — 85027 COMPLETE CBC AUTOMATED: CPT

## 2023-01-26 PROCEDURE — 84484 ASSAY OF TROPONIN QUANT: CPT

## 2023-01-26 PROCEDURE — 96361 HYDRATE IV INFUSION ADD-ON: CPT

## 2023-01-26 PROCEDURE — 2500000003 HC RX 250 WO HCPCS

## 2023-01-26 PROCEDURE — 86658 ENTEROVIRUS ANTIBODY: CPT

## 2023-01-26 PROCEDURE — 86664 EPSTEIN-BARR NUCLEAR ANTIGEN: CPT

## 2023-01-26 PROCEDURE — C1894 INTRO/SHEATH, NON-LASER: HCPCS

## 2023-01-26 PROCEDURE — 86880 COOMBS TEST DIRECT: CPT

## 2023-01-26 PROCEDURE — 6370000000 HC RX 637 (ALT 250 FOR IP): Performed by: EMERGENCY MEDICINE

## 2023-01-26 PROCEDURE — 6370000000 HC RX 637 (ALT 250 FOR IP): Performed by: STUDENT IN AN ORGANIZED HEALTH CARE EDUCATION/TRAINING PROGRAM

## 2023-01-26 PROCEDURE — 6360000002 HC RX W HCPCS

## 2023-01-26 PROCEDURE — 80061 LIPID PANEL: CPT

## 2023-01-26 PROCEDURE — 6370000000 HC RX 637 (ALT 250 FOR IP): Performed by: INTERNAL MEDICINE

## 2023-01-26 PROCEDURE — 2580000003 HC RX 258: Performed by: INTERNAL MEDICINE

## 2023-01-26 PROCEDURE — 83036 HEMOGLOBIN GLYCOSYLATED A1C: CPT

## 2023-01-26 PROCEDURE — 93005 ELECTROCARDIOGRAM TRACING: CPT | Performed by: STUDENT IN AN ORGANIZED HEALTH CARE EDUCATION/TRAINING PROGRAM

## 2023-01-26 PROCEDURE — 80048 BASIC METABOLIC PNL TOTAL CA: CPT

## 2023-01-26 PROCEDURE — 36415 COLL VENOUS BLD VENIPUNCTURE: CPT

## 2023-01-26 PROCEDURE — 85045 AUTOMATED RETICULOCYTE COUNT: CPT

## 2023-01-26 PROCEDURE — 86663 EPSTEIN-BARR ANTIBODY: CPT

## 2023-01-26 PROCEDURE — 93458 L HRT ARTERY/VENTRICLE ANGIO: CPT

## 2023-01-26 PROCEDURE — 2580000003 HC RX 258: Performed by: STUDENT IN AN ORGANIZED HEALTH CARE EDUCATION/TRAINING PROGRAM

## 2023-01-26 PROCEDURE — 96374 THER/PROPH/DIAG INJ IV PUSH: CPT

## 2023-01-26 PROCEDURE — 99221 1ST HOSP IP/OBS SF/LOW 40: CPT | Performed by: INTERNAL MEDICINE

## 2023-01-26 PROCEDURE — 83735 ASSAY OF MAGNESIUM: CPT

## 2023-01-26 PROCEDURE — 6360000002 HC RX W HCPCS: Performed by: EMERGENCY MEDICINE

## 2023-01-26 PROCEDURE — 93458 L HRT ARTERY/VENTRICLE ANGIO: CPT | Performed by: INTERNAL MEDICINE

## 2023-01-26 PROCEDURE — 94761 N-INVAS EAR/PLS OXIMETRY MLT: CPT

## 2023-01-26 PROCEDURE — 2060000000 HC ICU INTERMEDIATE R&B

## 2023-01-26 PROCEDURE — B2151ZZ FLUOROSCOPY OF LEFT HEART USING LOW OSMOLAR CONTRAST: ICD-10-PCS | Performed by: INTERNAL MEDICINE

## 2023-01-26 PROCEDURE — B2111ZZ FLUOROSCOPY OF MULTIPLE CORONARY ARTERIES USING LOW OSMOLAR CONTRAST: ICD-10-PCS | Performed by: INTERNAL MEDICINE

## 2023-01-26 PROCEDURE — 4A023N7 MEASUREMENT OF CARDIAC SAMPLING AND PRESSURE, LEFT HEART, PERCUTANEOUS APPROACH: ICD-10-PCS | Performed by: INTERNAL MEDICINE

## 2023-01-26 PROCEDURE — 93306 TTE W/DOPPLER COMPLETE: CPT

## 2023-01-26 PROCEDURE — 93010 ELECTROCARDIOGRAM REPORT: CPT | Performed by: INTERNAL MEDICINE

## 2023-01-26 PROCEDURE — 2709999900 HC NON-CHARGEABLE SUPPLY

## 2023-01-26 PROCEDURE — C1769 GUIDE WIRE: HCPCS

## 2023-01-26 PROCEDURE — 81001 URINALYSIS AUTO W/SCOPE: CPT

## 2023-01-26 PROCEDURE — 93005 ELECTROCARDIOGRAM TRACING: CPT | Performed by: EMERGENCY MEDICINE

## 2023-01-26 PROCEDURE — 86665 EPSTEIN-BARR CAPSID VCA: CPT

## 2023-01-26 PROCEDURE — 84100 ASSAY OF PHOSPHORUS: CPT

## 2023-01-26 RX ORDER — METOPROLOL TARTRATE 50 MG/1
50 TABLET, FILM COATED ORAL 2 TIMES DAILY
Status: DISCONTINUED | OUTPATIENT
Start: 2023-01-26 | End: 2023-01-29 | Stop reason: HOSPADM

## 2023-01-26 RX ORDER — INDOMETHACIN 25 MG/1
25 CAPSULE ORAL
Status: DISCONTINUED | OUTPATIENT
Start: 2023-01-26 | End: 2023-01-27

## 2023-01-26 RX ORDER — ACETAMINOPHEN 650 MG/1
650 SUPPOSITORY RECTAL EVERY 6 HOURS PRN
Status: DISCONTINUED | OUTPATIENT
Start: 2023-01-26 | End: 2023-01-29 | Stop reason: HOSPADM

## 2023-01-26 RX ORDER — SODIUM CHLORIDE 0.9 % (FLUSH) 0.9 %
5-40 SYRINGE (ML) INJECTION EVERY 12 HOURS SCHEDULED
Status: DISCONTINUED | OUTPATIENT
Start: 2023-01-26 | End: 2023-01-29 | Stop reason: HOSPADM

## 2023-01-26 RX ORDER — ACETAMINOPHEN 500 MG
1000 TABLET ORAL ONCE
Status: COMPLETED | OUTPATIENT
Start: 2023-01-26 | End: 2023-01-26

## 2023-01-26 RX ORDER — SODIUM CHLORIDE 0.9 % (FLUSH) 0.9 %
5-40 SYRINGE (ML) INJECTION PRN
Status: DISCONTINUED | OUTPATIENT
Start: 2023-01-26 | End: 2023-01-29 | Stop reason: HOSPADM

## 2023-01-26 RX ORDER — IBUPROFEN 600 MG/1
600 TABLET ORAL EVERY 6 HOURS PRN
Status: DISCONTINUED | OUTPATIENT
Start: 2023-01-26 | End: 2023-01-27

## 2023-01-26 RX ORDER — SODIUM CHLORIDE 9 MG/ML
INJECTION, SOLUTION INTRAVENOUS PRN
Status: DISCONTINUED | OUTPATIENT
Start: 2023-01-26 | End: 2023-01-29 | Stop reason: HOSPADM

## 2023-01-26 RX ORDER — CLOPIDOGREL BISULFATE 75 MG/1
75 TABLET ORAL DAILY
Status: DISCONTINUED | OUTPATIENT
Start: 2023-01-26 | End: 2023-01-26

## 2023-01-26 RX ORDER — POLYETHYLENE GLYCOL 3350 17 G/17G
17 POWDER, FOR SOLUTION ORAL DAILY PRN
Status: DISCONTINUED | OUTPATIENT
Start: 2023-01-26 | End: 2023-01-29 | Stop reason: HOSPADM

## 2023-01-26 RX ORDER — ASPIRIN 81 MG/1
81 TABLET, CHEWABLE ORAL DAILY
Status: DISCONTINUED | OUTPATIENT
Start: 2023-01-26 | End: 2023-01-27

## 2023-01-26 RX ORDER — ONDANSETRON 4 MG/1
4 TABLET, ORALLY DISINTEGRATING ORAL EVERY 8 HOURS PRN
Status: DISCONTINUED | OUTPATIENT
Start: 2023-01-26 | End: 2023-01-29 | Stop reason: HOSPADM

## 2023-01-26 RX ORDER — KETOROLAC TROMETHAMINE 30 MG/ML
30 INJECTION, SOLUTION INTRAMUSCULAR; INTRAVENOUS ONCE
Status: COMPLETED | OUTPATIENT
Start: 2023-01-26 | End: 2023-01-26

## 2023-01-26 RX ORDER — ENOXAPARIN SODIUM 100 MG/ML
40 INJECTION SUBCUTANEOUS DAILY
Status: DISCONTINUED | OUTPATIENT
Start: 2023-01-26 | End: 2023-01-26

## 2023-01-26 RX ORDER — ONDANSETRON 2 MG/ML
4 INJECTION INTRAMUSCULAR; INTRAVENOUS EVERY 6 HOURS PRN
Status: DISCONTINUED | OUTPATIENT
Start: 2023-01-26 | End: 2023-01-29 | Stop reason: HOSPADM

## 2023-01-26 RX ORDER — ATORVASTATIN CALCIUM 40 MG/1
40 TABLET, FILM COATED ORAL NIGHTLY
Status: DISCONTINUED | OUTPATIENT
Start: 2023-01-26 | End: 2023-01-27

## 2023-01-26 RX ORDER — ACETAMINOPHEN 325 MG/1
650 TABLET ORAL EVERY 4 HOURS PRN
Status: DISCONTINUED | OUTPATIENT
Start: 2023-01-26 | End: 2023-01-29 | Stop reason: HOSPADM

## 2023-01-26 RX ORDER — ACETAMINOPHEN 325 MG/1
650 TABLET ORAL EVERY 6 HOURS PRN
Status: DISCONTINUED | OUTPATIENT
Start: 2023-01-26 | End: 2023-01-29 | Stop reason: HOSPADM

## 2023-01-26 RX ADMIN — ACETAMINOPHEN 1000 MG: 500 TABLET ORAL at 03:35

## 2023-01-26 RX ADMIN — ASPIRIN 81 MG CHEWABLE TABLET 81 MG: 81 TABLET CHEWABLE at 09:16

## 2023-01-26 RX ADMIN — KETOROLAC TROMETHAMINE 30 MG: 30 INJECTION, SOLUTION INTRAMUSCULAR; INTRAVENOUS at 03:36

## 2023-01-26 RX ADMIN — INDOMETHACIN 25 MG: 25 CAPSULE ORAL at 11:58

## 2023-01-26 RX ADMIN — Medication 10 ML: at 09:16

## 2023-01-26 RX ADMIN — CLOPIDOGREL BISULFATE 75 MG: 75 TABLET ORAL at 09:16

## 2023-01-26 RX ADMIN — METOPROLOL TARTRATE 50 MG: 50 TABLET, FILM COATED ORAL at 20:49

## 2023-01-26 RX ADMIN — Medication 10 ML: at 20:50

## 2023-01-26 RX ADMIN — Medication 10 ML: at 20:49

## 2023-01-26 RX ADMIN — METOPROLOL TARTRATE 50 MG: 50 TABLET, FILM COATED ORAL at 11:58

## 2023-01-26 RX ADMIN — ACETAMINOPHEN 650 MG: 325 TABLET ORAL at 13:20

## 2023-01-26 RX ADMIN — INDOMETHACIN 25 MG: 25 CAPSULE ORAL at 17:45

## 2023-01-26 RX ADMIN — ATORVASTATIN CALCIUM 40 MG: 40 TABLET, FILM COATED ORAL at 20:49

## 2023-01-26 ASSESSMENT — ENCOUNTER SYMPTOMS
EYE DISCHARGE: 0
VOMITING: 0
SHORTNESS OF BREATH: 0
VOICE CHANGE: 0
SINUS PRESSURE: 0
BLOOD IN STOOL: 0
SINUS PAIN: 0
EYE PAIN: 0
ABDOMINAL PAIN: 0
BACK PAIN: 0
NAUSEA: 0
COUGH: 0
DIARRHEA: 0
CHEST TIGHTNESS: 1

## 2023-01-26 ASSESSMENT — PAIN SCALES - GENERAL
PAINLEVEL_OUTOF10: 0
PAINLEVEL_OUTOF10: 0
PAINLEVEL_OUTOF10: 3
PAINLEVEL_OUTOF10: 3
PAINLEVEL_OUTOF10: 0

## 2023-01-26 ASSESSMENT — PAIN DESCRIPTION - ORIENTATION
ORIENTATION: MID
ORIENTATION: MID

## 2023-01-26 ASSESSMENT — PAIN DESCRIPTION - LOCATION
LOCATION: CHEST
LOCATION: CHEST

## 2023-01-26 ASSESSMENT — PAIN DESCRIPTION - DESCRIPTORS
DESCRIPTORS: HEAVINESS
DESCRIPTORS: HEAVINESS

## 2023-01-26 NOTE — ED NOTES
Report given to GT RN at [facility].        Alex Lamar, NUNO  01/26/23 2287

## 2023-01-26 NOTE — PROGRESS NOTES
Nutrition Note    Positive nutrition screen for unintentional weight loss. MST: 1   Pt with consistent weight, possibly weight gain per chart review. Pt eating dinner at time of room visit. States smells and tastes good. Happy to enjoy a meal for the first time in a couple of days. Pt states weight loss intentional and needed. States portion control and healthier eating PTA. Pt with no specific questions at this time. Will follow per nutrition protocol during los.     Electronically signed by Margarita Barajas RD, LD on 1/26/23 at 5:02 PM EST    Contact: [phone]

## 2023-01-26 NOTE — CARE COORDINATION
CM to pt's room to initiate discharge planning. Pt out of room for a procedure. Will revisit as time permits. Pt has pcp and insurance.

## 2023-01-26 NOTE — ED NOTES
Thought I recognized ST changes. Spoke to Dr Jason Atkinson about it. New orders received.        Ruth Rendon RN  01/26/23 0103

## 2023-01-26 NOTE — PROGRESS NOTES
Admitted by my colleague earlier this morning. Patient seen and examined  Awaiting cardiology consultation. Platelets trending down however no signs of bleeding. Still above 10,000 we will hold off on transfusions for now. Pending further laboratory work-up.

## 2023-01-26 NOTE — H&P
V2.0  History and Physical      Name:  Lisette Jarvis /Age/Sex: 1960  (58 y.o. male)   MRN & CSN:  <[id number]> & [id number] Encounter Date/Time: 2023 4:38 AM EST   Location:  -A PCP: Mitra DAVE MD            Assessment and Plan:   Lisette Jarvis is a 58 y.o. male with thyroid nodule who presents with atypical chest pain        NSTEMI versus acute pericarditis versus acute myocarditis?: Presented with atypical chest pain associated with URI and myalgias symptoms. Mild fever of 100.2. CTA negative for PE. Heart rate around 130s. Received 1 L of LR. EKG showed occasional PVCs with nonspecific T wave changes. Initial troponin 0.039, repeat troponin of 0.045. proBNP of 1459. Platelet count is low at 14. Cardiology was contacted who recommended the patient to receive full dose of aspirin. Cardiology consult has been placed. Continue aspirin statin. Viral panel negative. Coxsackie virus panel has been ordered. Echocardiogram pending. Denies any recent international travel. Hypomagnesemia replete as needed  Severe thrombocytopenia: Unclear etiology. Can be related to his recent viral infection? .  Will order detailed comprehensive viral panel. Normocytic normochromic anemia? We will order reticulocyte count, Marc test.  Thyroid nodule: Undergoing outpatient work-up for that. Disposition:   Current Living situation: Home  Expected Disposition: Home  Estimated D/C: 1 to 2 days    Diet No diet orders on file   DVT Prophylaxis [] Lovenox, []  Heparin, [x] SCDs, [] Ambulation,  [] Eliquis, [] Xarelto   Code Status No Order   Surrogate Decision Maker/ POA      History from:     patient    History of Present Illness:     Chief Complaint: <principal problem not specified>  The patient presented with pressure-like sensation in the chest associated with nausea and vomiting as well as mild shortness of breath with significant relief of the symptoms after use of ibuprofen at.   Patient denies any history of heart problems family history of heart disease smoking diabetes hypertension hyperlipidemia. Patient was found to be mildly febrile with associated symptoms of myalgias and generalized achiness. Denies any sick exposure. The patient in the ED was found to have a blood pressure of 150/75 with a heart rate of 133 temperature 102.4, repeat temperature of 100.3. Satting at 97% on room air. Denies any recent international travel. Review of Systems: Need 10 Elements   Review of Systems   Constitutional:  Negative for appetite change, chills, fever and unexpected weight change. HENT:  Negative for ear pain, hearing loss, sinus pressure, sinus pain and voice change. Eyes:  Negative for pain, discharge and visual disturbance. Respiratory:  Positive for chest tightness. Negative for cough and shortness of breath. Cardiovascular:  Positive for palpitations. Negative for chest pain and leg swelling. Gastrointestinal:  Negative for abdominal pain, blood in stool, diarrhea, nausea and vomiting. Genitourinary:  Negative for dysuria and frequency. Musculoskeletal:  Positive for myalgias. Negative for arthralgias and back pain. Neurological:  Positive for weakness. Negative for dizziness, light-headedness and headaches. Psychiatric/Behavioral:  Positive for sleep disturbance. Objective:   No intake or output data in the 24 hours ending 01/26/23 0438   Vitals: There were no vitals filed for this visit. Medications Prior to Admission     Prior to Admission medications    Medication Sig Start Date End Date Taking?  Authorizing Provider   omega-3 acid ethyl esters (LOVAZA) 1 g capsule TAKE 2 CAPSULES BY MOUTH TWICE A DAY 1/12/23   Historical Provider, MD   omeprazole (PRILOSEC) 20 MG delayed release capsule TAKE 1 CAPSULE BY MOUTH EVERY DAY 1/11/23   Historical Provider, MD   buPROPion (WELLBUTRIN XL) 150 MG extended release tablet bupropion HCl  mg 24 hr tablet, extended release  Patient not taking: Reported on 1/25/2023    Historical Provider, MD   Icosapent Ethyl (VASCEPA) 1 g CAPS capsule Vascepa 1 gram capsule    Historical Provider, MD   dexlansoprazole (DEXILANT) 60 MG CPDR delayed release capsule Dexilant 60 mg capsule, delayed release   TAKE 1 CAPSULE BY MOUTH EVERY DAY FOR 56 DAYS  Patient not taking: Reported on 1/25/2023    Historical Provider, MD   sulfamethoxazole-trimethoprim (BACTRIM DS;SEPTRA DS) 800-160 MG per tablet sulfamethoxazole 800 mg-trimethoprim 160 mg tablet  Patient not taking: Reported on 1/25/2023    Historical Provider, MD   tobramycin (TOBREX) 0.3 % ophthalmic solution Tobrex 0.3 % eye drops   instill 2 drops in affected eyes three x day  Patient not taking: Reported on 1/25/2023    Historical Provider, MD   ibuprofen (ADVIL;MOTRIN) 600 MG tablet Take 600 mg by mouth every 6 hours as needed for Pain. Historical Provider, MD   naproxen (NAPROSYN) 500 MG tablet Take 1 tablet by mouth 2 times daily as needed for Pain for up to 10 days. 2/4/15 2/14/15  Mckinley Barclay MD       Physical Exam: Need 8 Elements   Physical Exam  Vitals reviewed. Constitutional:       Appearance: Normal appearance. He is normal weight. HENT:      Head: Normocephalic. Nose: Congestion present. Mouth/Throat:      Mouth: Mucous membranes are dry. Eyes:      Conjunctiva/sclera: Conjunctivae normal.      Pupils: Pupils are equal, round, and reactive to light. Cardiovascular:      Rate and Rhythm: Normal rate and regular rhythm. Pulses: Normal pulses. Heart sounds: Normal heart sounds. No murmur heard. No friction rub. No gallop. Pulmonary:      Effort: Pulmonary effort is normal.      Breath sounds: Normal breath sounds. No wheezing, rhonchi or rales. Abdominal:      General: Abdomen is flat. Bowel sounds are normal. There is no distension. Palpations: Abdomen is soft. Tenderness: There is no abdominal tenderness.    Musculoskeletal: General: No deformity. Normal range of motion. Cervical back: Normal range of motion and neck supple. Right lower leg: No edema. Left lower leg: No edema. Skin:     General: Skin is dry. Coloration: Skin is not jaundiced or pale. Findings: No erythema, lesion or rash. Neurological:      General: No focal deficit present. Mental Status: He is alert and oriented to person, place, and time. Mental status is at baseline. Motor: Weakness present. Past History:   PMHx history of a thyroid nodule    PSHX:  has a past surgical history that includes Appendectomy; knee surgery; and Ankle surgery (Left). Fam HX: Family history of hypertension    Soc HX:   Social History     Socioeconomic History    Marital status: Single   Tobacco Use    Smoking status: Former     Types: Cigarettes   Vaping Use    Vaping Use: Never used   Substance and Sexual Activity    Alcohol use: Yes     Alcohol/week: 4.0 standard drinks     Types: 2 Glasses of wine, 2 Shots of liquor per week     Comment: Weekends    Drug use: No       Allergies: Allergies: No Known Allergies    Medications:   Medications:    Infusions:   PRN Meds:     Labs      CBC:   Recent Labs     01/25/23 1840   WBC 6.7   HGB 9.5*   PLT 14*     BMP:    Recent Labs     01/25/23 1840   *   K 4.1   CL 96*   CO2 24   BUN 17   CREATININE 1.0   GLUCOSE 101*     Hepatic:   Recent Labs     01/25/23 1840   AST 20   ALT 12   BILITOT 0.8   ALKPHOS 88     Lipids: No results found for: CHOL, HDL, TRIG  Hemoglobin A1C: No results found for: LABA1C  TSH: No results found for: TSH  Troponin:   Lab Results   Component Value Date/Time    TROPONINT 0.028 01/26/2023 03:25 AM    TROPONINT 0.055 01/25/2023 09:10 PM    TROPONINT 0.039 01/25/2023 06:40 PM     Lactic Acid: No results for input(s): LACTA in the last 72 hours.   BNP:   Recent Labs     01/25/23 1840   PROBNP 1,459*     UA:  Lab Results   Component Value Date/Time    NITRU NEGATIVE 01/26/2023 12:30 AM    COLORU YELLOW 01/26/2023 12:30 AM    WBCUA <0 01/26/2023 12:30 AM    RBCUA 0 01/26/2023 12:30 AM    BACTERIA FEW 01/26/2023 12:30 AM    CLARITYU CLEAR 01/26/2023 12:30 AM    SPECGRAV 1.010 01/26/2023 12:30 AM    LEUKOCYTESUR NEGATIVE 01/26/2023 12:30 AM    UROBILINOGEN 0.2 01/26/2023 12:30 AM    BILIRUBINUR NEGATIVE 01/26/2023 12:30 AM    BLOODU SMALL NUMBER OR AMOUNT OBSERVED 01/26/2023 12:30 AM    KETUA NEGATIVE 01/26/2023 12:30 AM     Urine Cultures: No results found for: LABURIN  Blood Cultures: No results found for: BC  No results found for: BLOODCULT2  Organism: No results found for: ORG    Imaging/Diagnostics Last 24 Hours   XR CHEST PORTABLE    Result Date: 1/25/2023  EXAMINATION: ONE XRAY VIEW OF THE CHEST 1/25/2023 7:03 pm COMPARISON: 12/13/2022 HISTORY: Acute chest pain and fever. FINDINGS: Cardiomediastinal silhouette at the upper limit of normal.  Possible emphysema.  No acute airspace disease, pleural effusion, or pneumothorax. Evidence of chronic granulomatous disease.     No acute abnormality.     CTA PULMONARY W CONTRAST    Result Date: 1/25/2023  EXAMINATION: CTA OF THE CHEST 1/25/2023 8:13 pm TECHNIQUE: CTA of the chest was performed after the administration of 75 mL Isovue 370 intravenous contrast.  Multiplanar reformatted images are provided for review.  MIP images are provided for review. Automated exposure control, iterative reconstruction, and/or weight based adjustment of the mA/kV was utilized to reduce the radiation dose to as low as reasonably achievable. COMPARISON: Correlation with concurrent chest radiograph HISTORY: Acute chest pain. FINDINGS: Image quality mildly degraded by motion artifact. Pulmonary Arteries: Pulmonary arteries are adequately opacified for evaluation.  No intraluminal filling defect to suggest pulmonary embolism. Main pulmonary artery is normal in caliber. Mediastinum: Heart is not enlarged. Systemic and coronary atherosclerotic  calcification. At least moderate aortic valvular calcification. No pericardial effusion. Thoracic aorta is normal caliber. No lymphadenopathy. Calcified mediastinal and hilar lymph nodes, right greater than left. Thyroid and esophagus are unremarkable. Lungs/pleura: Mild dependent atelectasis. Mild scarring in both lungs. No acute airspace disease or pleural effusion. Scattered calcified granulomas. Upper Abdomen: Calcified granulomas within the spleen and liver. Scattered hypodense liver lesions are likely benign. Soft Tissues/Bones: No acute abnormality. Chronic fracture of the left 7th rib. No pulmonary embolism or acute pulmonary abnormality.        Personally reviewed Lab Studies, Imaging    Electronically signed by Marizol Richard MD, MD on 1/26/2023 at 4:38 AM

## 2023-01-26 NOTE — CONSULTS
CARDIOLOGY CONSULT NOTE   Reason for consultation:  chest pain     Referring physician:  Daniella Caldwell MD     Primary care physician: Fabian Griffin MD      Dear  Dr. Daniella Caldwell MD   Thanks for the consult. Chief Complaints :No chief complaint on file. History of present illness:Goyo is a 58 y. o.year old who presents with complains of chest tightness pain middle of the chest does not radiate tends to get worse when he lays down gets better when he sits up gets worse with deep inspiration 2 out of 10 more or less progressively getting worse since yesterday morning he feels nausea reports previous history of GERD reflux? EKG is normal troponins are nonspecific  In the emergency department noted to be tachycardic and had low-grade fever patient was negative for COVID  CT negative for PE    Past medical history:    has no past medical history on file. Past surgical history:   has a past surgical history that includes Appendectomy; knee surgery; and Ankle surgery (Left). Social History:   reports that he has quit smoking. His smoking use included cigarettes. He does not have any smokeless tobacco history on file. He reports current alcohol use of about 4.0 standard drinks per week. He reports that he does not use drugs.   Family history:   no family history of CAD, STROKE of DM at early age    No Known Allergies    sodium chloride flush 0.9 % injection 5-40 mL, 2 times per day  sodium chloride flush 0.9 % injection 5-40 mL, PRN  0.9 % sodium chloride infusion, PRN  ondansetron (ZOFRAN-ODT) disintegrating tablet 4 mg, Q8H PRN   Or  ondansetron (ZOFRAN) injection 4 mg, Q6H PRN  polyethylene glycol (GLYCOLAX) packet 17 g, Daily PRN  acetaminophen (TYLENOL) tablet 650 mg, Q6H PRN   Or  acetaminophen (TYLENOL) suppository 650 mg, Q6H PRN  atorvastatin (LIPITOR) tablet 40 mg, Nightly  aspirin chewable tablet 81 mg, Daily  metoprolol tartrate (LOPRESSOR) tablet 50 mg, BID  indomethacin (INDOCIN) capsule 25 mg, TID WC  ibuprofen (ADVIL;MOTRIN) tablet 600 mg, Q6H PRN  sodium chloride flush 0.9 % injection 5-40 mL, 2 times per day  sodium chloride flush 0.9 % injection 5-40 mL, PRN  0.9 % sodium chloride infusion, PRN  acetaminophen (TYLENOL) tablet 650 mg, Q4H PRN      Current Facility-Administered Medications   Medication Dose Route Frequency Provider Last Rate Last Admin    sodium chloride flush 0.9 % injection 5-40 mL  5-40 mL IntraVENous 2 times per day Adeola Gregory MD   10 mL at 01/26/23 0916    sodium chloride flush 0.9 % injection 5-40 mL  5-40 mL IntraVENous PRN Adeola Gregory MD        0.9 % sodium chloride infusion   IntraVENous PRN Adeola Gregory MD        ondansetron (ZOFRAN-ODT) disintegrating tablet 4 mg  4 mg Oral Q8H PRN Adeola Gregory MD        Or    ondansetron (ZOFRAN) injection 4 mg  4 mg IntraVENous Q6H PRN Adeola Gregory MD        polyethylene glycol (GLYCOLAX) packet 17 g  17 g Oral Daily PRN Adeola Gregory MD        acetaminophen (TYLENOL) tablet 650 mg  650 mg Oral Q6H PRN Adeola Gregory MD   650 mg at 01/26/23 1320    Or    acetaminophen (TYLENOL) suppository 650 mg  650 mg Rectal Q6H PRN Adeola Gregory MD        atorvastatin (LIPITOR) tablet 40 mg  40 mg Oral Nightly Adeola Gregory MD        aspirin chewable tablet 81 mg  81 mg Oral Daily Adeola Gregory MD   81 mg at 01/26/23 0916    metoprolol tartrate (LOPRESSOR) tablet 50 mg  50 mg Oral BID Adeola Gregory MD   50 mg at 01/26/23 1158    indomethacin (INDOCIN) capsule 25 mg  25 mg Oral TID WC Adeola Gregory MD   25 mg at 01/26/23 1158    ibuprofen (ADVIL;MOTRIN) tablet 600 mg  600 mg Oral Q6H PRN Adeola Gregory MD        sodium chloride flush 0.9 % injection 5-40 mL  5-40 mL IntraVENous 2 times per day Adeola Gregory MD        sodium chloride flush 0.9 % injection 5-40 mL  5-40 mL IntraVENous PRN Adeola Gregory MD        0.9 % sodium chloride infusion   IntraVENous PRN Adeola Gregory MD        acetaminophen (TYLENOL) tablet 650 mg  650 mg Oral Q4H PRN Adeola Gregory MD Review of Systems:   Constitutional: No Fever or Weight Loss   Eyes: No Decreased Vision  ENT: No Headaches, Hearing Loss or Vertigo  Cardiovascular: As per HPI  Respiratory: As per HPI  Gastrointestinal: No abdominal pain, appetite loss, blood in stools, constipation, diarrhea or heartburn  Genitourinary: No dysuria, trouble voiding, or hematuria  Musculoskeletal:  No gait disturbance, weakness or joint complaints  Integumentary: No rash or pruritis  Neurological: No TIA or stroke symptoms  Psychiatric: No anxiety or depression  Endocrine: No malaise, fatigue or temperature intolerance  Hematologic/Lymphatic: No bleeding problems, blood clots or swollen lymph nodes  Allergic/Immunologic: No nasal congestion or hives  All systems negative except as marked. Physical Examination:    Vitals:    01/26/23 1307 01/26/23 1315 01/26/23 1330 01/26/23 1336   BP:  121/81 115/78 115/78   Pulse: (!) 104 (!) 104 (!) 105 (!) 105   Resp: 29 29 29 23   Temp:       TempSrc:       SpO2: 93% 93% 94% 94%   Weight:       Height:           General Appearance:  No distress, conversant    Constitutional:  Well developed, Well nourished, No acute distress, Non-toxic appearance. HENT:  Normocephalic, Atraumatic, Bilateral external ears normal, Oropharynx moist, No oral exudates, Nose normal. Neck- Normal range of motion, No tenderness, Supple, No stridor,no apical-carotid delay  Lymphatics : no palpable lymph nodes  Eyes:  PERRL, EOMI, Conjunctiva normal, No discharge. Respiratory:  Normal breath sounds, No respiratory distress, No wheezing, No chest tenderness. ,no use of accessory muscles, crackles Absent   Cardiovascular: (PMI) apex non displaced,no lifts no thrills, ankle swelling Absent  , 1+, s1 and s2 audible,Murmur. Absent , JVD not noted    Abdomen /GI:  Bowel sounds normal, Soft, No tenderness, No masses, No gross visceromegaly   :  No costovertebral angle tenderness   Musculoskeletal:  No edema, no tenderness, no deformities. Back- no tenderness  Integument:  Well hydrated, no rash   Lymphatic:  No lymphadenopathy noted   Neurologic:  Alert & oriented x 3, CN 2-12 normal, normal motor function, normal sensory function, no focal deficits noted           Medical decision making and Data review:    Lab Review   Recent Labs     01/26/23  0652   WBC 5.4   HGB 8.2*   HCT 25.1*   PLT 11*      Recent Labs     01/26/23  0652   *   K 3.7      CO2 22   PHOS 3.1   BUN 18   CREATININE 0.8*     Recent Labs     01/25/23  1840   AST 20   ALT 12   BILITOT 0.8   ALKPHOS 88     Recent Labs     01/26/23  0325 01/26/23  0652 01/26/23  1025   TROPONINT 0.028* 0.033* 0.029*       Recent Labs     01/25/23  1840   PROBNP 1,459*     No results found for: INR, PROTIME    EKG: (reviewed by myself)    ECHO:(reviewed by myself)    Chest Xray:(reviewed by myself)  XR CHEST PORTABLE    Result Date: 1/25/2023  EXAMINATION: ONE XRAY VIEW OF THE CHEST 1/25/2023 7:03 pm COMPARISON: 12/13/2022 HISTORY: Acute chest pain and fever. FINDINGS: Cardiomediastinal silhouette at the upper limit of normal.  Possible emphysema. No acute airspace disease, pleural effusion, or pneumothorax. Evidence of chronic granulomatous disease. No acute abnormality. CTA PULMONARY W CONTRAST    Result Date: 1/25/2023  EXAMINATION: CTA OF THE CHEST 1/25/2023 8:13 pm TECHNIQUE: CTA of the chest was performed after the administration of 75 mL Isovue 370 intravenous contrast.  Multiplanar reformatted images are provided for review. MIP images are provided for review. Automated exposure control, iterative reconstruction, and/or weight based adjustment of the mA/kV was utilized to reduce the radiation dose to as low as reasonably achievable. COMPARISON: Correlation with concurrent chest radiograph HISTORY: Acute chest pain. FINDINGS: Image quality mildly degraded by motion artifact. Pulmonary Arteries: Pulmonary arteries are adequately opacified for evaluation.   No intraluminal filling defect to suggest pulmonary embolism. Main pulmonary artery is normal in caliber. Mediastinum: Heart is not enlarged. Systemic and coronary atherosclerotic calcification. At least moderate aortic valvular calcification. No pericardial effusion. Thoracic aorta is normal caliber. No lymphadenopathy. Calcified mediastinal and hilar lymph nodes, right greater than left. Thyroid and esophagus are unremarkable. Lungs/pleura: Mild dependent atelectasis. Mild scarring in both lungs. No acute airspace disease or pleural effusion. Scattered calcified granulomas. Upper Abdomen: Calcified granulomas within the spleen and liver. Scattered hypodense liver lesions are likely benign. Soft Tissues/Bones: No acute abnormality. Chronic fracture of the left 7th rib. No pulmonary embolism or acute pulmonary abnormality. All labs, medications and tests reviewed by myself including data  from outside source , patient and available family . Continue all other medications of all above medical condition listed as is. Impression:  Principal Problem:    NSTEMI (non-ST elevated myocardial infarction) ([facility] [address]  Resolved Problems:    * No resolved hospital problems. *      Assessment: 58 y. o.year old with PMH of  has no past medical history on file. Plan and Recommendations:    Elevated Troponin : Chest pain concerning for possible pericarditis  Will plan cath to define anatomy   Get echo  Start metoprolol 50 mg bid  Get echo   HTN: stable, continue present medications   Start indocin 25 mg tid   DVT prophylaxis if no contraindication  6. Dyslipidemia: continue statins           Thank you  much for consult and giving us the opportunity in contributing in the care of this patient. Please feel free to call me for any questions.        Grisel Leon MD, 1/26/2023 2:21 PM

## 2023-01-26 NOTE — PROGRESS NOTES
Patient underwent cardiac catheterization through the right radial artery  Has no significant CAD  Has a normal EF  Has a gradient across the aortic valve which might be secondary to hyperdynamic status  However IHSS needs to be ruled out  We will check the echo and make further decisions

## 2023-01-26 NOTE — ED NOTES
Pt transferred to [facility] 2029 by [facility] transport team in stable condition with cardiac monitor and IV NS (ALS).        Myla Cunningham RN  01/26/23 Asif Carter RN  01/26/23 0754

## 2023-01-26 NOTE — ED NOTES
EKG done. As I entered the room, pt stated he was about to call because he is having midsternal chest heaviness 3/10 without radiation. No Nausea, SOB, no radiation, skin w/d.        Heath Fuentes RN  01/26/23 7278

## 2023-01-26 NOTE — ED NOTES
Admit [facility] 2029. Call report to [phone].   [facility] will transport and ETA is 0340.  (ALS)     Ruth Rendon RN  01/26/23 0680

## 2023-01-26 NOTE — PROGRESS NOTES
01/26/23 1209   Encounter Summary   Encounter Overview/Reason  Attempted Encounter   Service Provided For: Patient not available   Referral/Consult From: [facility]   Support System Friends/neighbors   Last Encounter  01/26/23  (Patient having cath)   Complexity of Encounter Low   Begin Time 1200   End Time  1210   Total Time Calculated 10 min   Encounter    Type Initial Screen/Assessment   Spiritual/Emotional needs   Type Spiritual Support   Assessment/Intervention/Outcome   Assessment Unable to assess   Plan and Referrals   Plan/Referrals Continue to visit, (comment)

## 2023-01-27 ENCOUNTER — APPOINTMENT (OUTPATIENT)
Dept: ULTRASOUND IMAGING | Age: 63
DRG: 281 | End: 2023-01-27
Attending: STUDENT IN AN ORGANIZED HEALTH CARE EDUCATION/TRAINING PROGRAM
Payer: COMMERCIAL

## 2023-01-27 PROBLEM — D69.6 THROMBOCYTOPENIA (HCC): Status: ACTIVE | Noted: 2023-01-27

## 2023-01-27 LAB
ANION GAP SERPL CALCULATED.3IONS-SCNC: 8 MMOL/L (ref 4–16)
BUN BLDV-MCNC: 22 MG/DL (ref 6–23)
C-REACTIVE PROTEIN, HIGH SENSITIVITY: 144.5 MG/L
CALCIUM SERPL-MCNC: 8.9 MG/DL (ref 8.3–10.6)
CHLORIDE BLD-SCNC: 105 MMOL/L (ref 99–110)
CO2: 24 MMOL/L (ref 21–32)
CREAT SERPL-MCNC: 0.7 MG/DL (ref 0.9–1.3)
FERRITIN: 1904 NG/ML (ref 30–400)
FOLATE: 7.9 NG/ML (ref 3.1–17.5)
GFR SERPL CREATININE-BSD FRML MDRD: >60 ML/MIN/1.73M2
GLUCOSE BLD-MCNC: 103 MG/DL (ref 70–99)
GLUCOSE BLD-MCNC: 118 MG/DL (ref 70–99)
GLUCOSE BLD-MCNC: 124 MG/DL (ref 70–99)
HAV IGM SER IA-ACNC: NON REACTIVE
HCT VFR BLD CALC: 24.7 % (ref 42–52)
HEMOGLOBIN: 8.2 GM/DL (ref 13.5–18)
HEPATITIS B CORE IGM ANTIBODY: NON REACTIVE
HEPATITIS B SURFACE ANTIGEN: NON REACTIVE
HEPATITIS C ANTIBODY: NON REACTIVE
IRON: 49 UG/DL (ref 59–158)
LACTATE DEHYDROGENASE: 385 IU/L (ref 120–246)
MAGNESIUM: 2.3 MG/DL (ref 1.8–2.4)
MCH RBC QN AUTO: 30.6 PG (ref 27–31)
MCHC RBC AUTO-ENTMCNC: 33.2 % (ref 32–36)
MCV RBC AUTO: 92.2 FL (ref 78–100)
PCT TRANSFERRIN: 24 % (ref 10–44)
PDW BLD-RTO: 17.8 % (ref 11.7–14.9)
PHOSPHORUS: 2.5 MG/DL (ref 2.5–4.9)
PLATELET # BLD: 12 K/CU MM (ref 140–440)
POTASSIUM SERPL-SCNC: 4 MMOL/L (ref 3.5–5.1)
RBC # BLD: 2.68 M/CU MM (ref 4.6–6.2)
SODIUM BLD-SCNC: 137 MMOL/L (ref 135–145)
TOTAL IRON BINDING CAPACITY: 202 UG/DL (ref 250–450)
TROPONIN T: 0.02 NG/ML
TROPONIN T: 0.02 NG/ML
UNSATURATED IRON BINDING CAPACITY: 153 UG/DL (ref 110–370)
VITAMIN B-12: 1681 PG/ML (ref 211–911)
WBC # BLD: 6.5 K/CU MM (ref 4–10.5)

## 2023-01-27 PROCEDURE — 2060000000 HC ICU INTERMEDIATE R&B

## 2023-01-27 PROCEDURE — 82962 GLUCOSE BLOOD TEST: CPT

## 2023-01-27 PROCEDURE — 84100 ASSAY OF PHOSPHORUS: CPT

## 2023-01-27 PROCEDURE — 6370000000 HC RX 637 (ALT 250 FOR IP): Performed by: PHYSICIAN ASSISTANT

## 2023-01-27 PROCEDURE — 99233 SBSQ HOSP IP/OBS HIGH 50: CPT | Performed by: INTERNAL MEDICINE

## 2023-01-27 PROCEDURE — 76700 US EXAM ABDOM COMPLETE: CPT

## 2023-01-27 PROCEDURE — 86140 C-REACTIVE PROTEIN: CPT

## 2023-01-27 PROCEDURE — 99222 1ST HOSP IP/OBS MODERATE 55: CPT | Performed by: INTERNAL MEDICINE

## 2023-01-27 PROCEDURE — 85027 COMPLETE CBC AUTOMATED: CPT

## 2023-01-27 PROCEDURE — 6360000002 HC RX W HCPCS: Performed by: PHYSICIAN ASSISTANT

## 2023-01-27 PROCEDURE — 94761 N-INVAS EAR/PLS OXIMETRY MLT: CPT

## 2023-01-27 PROCEDURE — 6370000000 HC RX 637 (ALT 250 FOR IP): Performed by: INTERNAL MEDICINE

## 2023-01-27 PROCEDURE — 36415 COLL VENOUS BLD VENIPUNCTURE: CPT

## 2023-01-27 PROCEDURE — 2580000003 HC RX 258: Performed by: INTERNAL MEDICINE

## 2023-01-27 PROCEDURE — 85652 RBC SED RATE AUTOMATED: CPT

## 2023-01-27 PROCEDURE — 83010 ASSAY OF HAPTOGLOBIN QUANT: CPT

## 2023-01-27 PROCEDURE — 83735 ASSAY OF MAGNESIUM: CPT

## 2023-01-27 PROCEDURE — 83615 LACTATE (LD) (LDH) ENZYME: CPT

## 2023-01-27 PROCEDURE — 80074 ACUTE HEPATITIS PANEL: CPT

## 2023-01-27 PROCEDURE — 87389 HIV-1 AG W/HIV-1&-2 AB AG IA: CPT

## 2023-01-27 PROCEDURE — 82728 ASSAY OF FERRITIN: CPT

## 2023-01-27 PROCEDURE — 84484 ASSAY OF TROPONIN QUANT: CPT

## 2023-01-27 PROCEDURE — 83550 IRON BINDING TEST: CPT

## 2023-01-27 PROCEDURE — 82746 ASSAY OF FOLIC ACID SERUM: CPT

## 2023-01-27 PROCEDURE — APPSS30 APP SPLIT SHARED TIME 16-30 MINUTES

## 2023-01-27 PROCEDURE — 80048 BASIC METABOLIC PNL TOTAL CA: CPT

## 2023-01-27 PROCEDURE — 83540 ASSAY OF IRON: CPT

## 2023-01-27 PROCEDURE — 82607 VITAMIN B-12: CPT

## 2023-01-27 RX ORDER — INSULIN LISPRO 100 [IU]/ML
0-8 INJECTION, SOLUTION INTRAVENOUS; SUBCUTANEOUS
Status: DISCONTINUED | OUTPATIENT
Start: 2023-01-27 | End: 2023-01-29 | Stop reason: HOSPADM

## 2023-01-27 RX ORDER — INSULIN LISPRO 100 [IU]/ML
0-4 INJECTION, SOLUTION INTRAVENOUS; SUBCUTANEOUS NIGHTLY
Status: DISCONTINUED | OUTPATIENT
Start: 2023-01-27 | End: 2023-01-29 | Stop reason: HOSPADM

## 2023-01-27 RX ORDER — DEXTROSE MONOHYDRATE 100 MG/ML
INJECTION, SOLUTION INTRAVENOUS CONTINUOUS PRN
Status: DISCONTINUED | OUTPATIENT
Start: 2023-01-27 | End: 2023-01-29 | Stop reason: HOSPADM

## 2023-01-27 RX ORDER — DEXAMETHASONE 4 MG/1
40 TABLET ORAL DAILY
Status: DISCONTINUED | OUTPATIENT
Start: 2023-01-27 | End: 2023-01-28

## 2023-01-27 RX ORDER — FAMOTIDINE 20 MG/1
20 TABLET, FILM COATED ORAL 2 TIMES DAILY
Status: DISCONTINUED | OUTPATIENT
Start: 2023-01-27 | End: 2023-01-29 | Stop reason: HOSPADM

## 2023-01-27 RX ADMIN — IMMUNE GLOBULIN (HUMAN) 90 G: 10 INJECTION INTRAVENOUS; SUBCUTANEOUS at 12:52

## 2023-01-27 RX ADMIN — Medication 20 ML: at 22:03

## 2023-01-27 RX ADMIN — DEXAMETHASONE 40 MG: 4 TABLET ORAL at 12:43

## 2023-01-27 RX ADMIN — Medication 10 ML: at 08:51

## 2023-01-27 RX ADMIN — FAMOTIDINE 20 MG: 20 TABLET ORAL at 12:43

## 2023-01-27 RX ADMIN — INDOMETHACIN 25 MG: 25 CAPSULE ORAL at 08:51

## 2023-01-27 RX ADMIN — METOPROLOL TARTRATE 50 MG: 50 TABLET, FILM COATED ORAL at 21:51

## 2023-01-27 RX ADMIN — INDOMETHACIN 25 MG: 25 CAPSULE ORAL at 12:43

## 2023-01-27 RX ADMIN — METOPROLOL TARTRATE 50 MG: 50 TABLET, FILM COATED ORAL at 08:50

## 2023-01-27 RX ADMIN — FAMOTIDINE 20 MG: 20 TABLET ORAL at 21:51

## 2023-01-27 RX ADMIN — ASPIRIN 81 MG CHEWABLE TABLET 81 MG: 81 TABLET CHEWABLE at 08:50

## 2023-01-27 ASSESSMENT — PAIN SCALES - GENERAL
PAINLEVEL_OUTOF10: 0
PAINLEVEL_OUTOF10: 0

## 2023-01-27 NOTE — CARE COORDINATION
CM met with pt to initiate discharge planning. Role of CM explained. Pt has insurance and is able to afford medication. Pt has PCP. Pt is from home alone. He has several supportive friends and neighbors. He drives and has a reliable vehicle. He is independent and lives in a two story home. Steps are not a problem for him. Plan home with no needs. CM contact information given to pt. CM team available as needs arise.

## 2023-01-27 NOTE — CONSULTS
Hematology/Oncology   Consult Note    Patient Name:  Anastasiia Meyers  Patient :  1960  Patient MRN:  [id number]     Primary Oncologist:   PCP: Lauren Heart MD     Date of Service: 2023      Reason for Consult: Thrombocytopenia     Chief Complaint:  No chief complaint on file. Principal Problem:    NSTEMI (non-ST elevated myocardial infarction) ([facility] [address]  Active Problems:    Precordial pain  Resolved Problems:    * No resolved hospital problems. *      HPI:   Anastasiia Meyers is a pleasant 58 y.o. male with a history of chronic neck pain and no other significant health history who presented initially to the [place] ED with complaints of worsening chest pressure. He was found to have troponin elevation and is s/p cardiac cath with no clinically significant blockages found. His plt were noted to be markedly decreased at 12k. He does not notice any spontaneous bruising or bleeding however has noticed few red marks that have resolved on their own. Hgb 8.2, unknown baseline. No s/s of active bleeding. He is also noted to have fever and myalgias with general malaise. No significant cough or other URI symptoms. No N/V. No recent unexplained weight loss or drenching night sweats. No change in bowel or bladder habits. No past medical history on file. Past Surgical History:   Procedure Laterality Date    ANKLE SURGERY Left     APPENDECTOMY      KNEE SURGERY                                                                                  Social History     Socioeconomic History    Marital status: Single     Spouse name: Not on file    Number of children: Not on file    Years of education: Not on file    Highest education level: Not on file   Occupational History    Not on file   Tobacco Use    Smoking status: Former     Types: Cigarettes    Smokeless tobacco: Not on file   Vaping Use    Vaping Use: Never used   Substance and Sexual Activity    Alcohol use:  Yes     Alcohol/week: 4.0 standard drinks Types: 2 Glasses of wine, 2 Shots of liquor per week     Comment: Weekends    Drug use: No    Sexual activity: Not on file   Other Topics Concern    Not on file   Social History Narrative    Not on file     Social Determinants of Health     Financial Resource Strain: Not on file   Food Insecurity: Not on file   Transportation Needs: Not on file   Physical Activity: Not on file   Stress: Not on file   Social Connections: Not on file   Intimate Partner Violence: Not on file   Housing Stability: Not on file                                                                                    No family history on file. No Known Allergies    No current facility-administered medications on file prior to encounter. Current Outpatient Medications on File Prior to Encounter   Medication Sig Dispense Refill    omega-3 acid ethyl esters (LOVAZA) 1 g capsule TAKE 2 CAPSULES BY MOUTH TWICE A DAY      omeprazole (PRILOSEC) 20 MG delayed release capsule TAKE 1 CAPSULE BY MOUTH EVERY DAY      buPROPion (WELLBUTRIN XL) 150 MG extended release tablet bupropion HCl  mg 24 hr tablet, extended release (Patient not taking: No sig reported)      Icosapent Ethyl (VASCEPA) 1 g CAPS capsule Vascepa 1 gram capsule      dexlansoprazole (DEXILANT) 60 MG CPDR delayed release capsule Dexilant 60 mg capsule, delayed release   TAKE 1 CAPSULE BY MOUTH EVERY DAY FOR 56 DAYS (Patient not taking: Reported on 1/25/2023)      sulfamethoxazole-trimethoprim (BACTRIM DS;SEPTRA DS) 800-160 MG per tablet sulfamethoxazole 800 mg-trimethoprim 160 mg tablet (Patient not taking: Reported on 1/27/2023)      tobramycin (TOBREX) 0.3 % ophthalmic solution Tobrex 0.3 % eye drops   instill 2 drops in affected eyes three x day (Patient not taking: No sig reported)      ibuprofen (ADVIL;MOTRIN) 600 MG tablet Take 600 mg by mouth every 6 hours as needed for Pain. naproxen (NAPROSYN) 500 MG tablet Take 1 tablet by mouth 2 times daily as needed for Pain for up to 10 days. 20 tablet 0        Review of Systems:  Review of Systems   Constitutional:  Positive for fatigue. Negative for chills, fever and unexpected weight change. HENT:   Negative for lump/mass, mouth sores and nosebleeds. Eyes:  Negative for eye problems and icterus. Respiratory:  Positive for shortness of breath. Negative for cough. Cardiovascular:  Positive for chest pain and palpitations. Negative for leg swelling. Gastrointestinal:  Negative for abdominal pain, nausea and vomiting. Genitourinary:  Negative for dysuria and hematuria. Musculoskeletal:  Positive for myalgias. Negative for arthralgias. Skin:  Positive for rash (?small red spots on mouth, now resolved). Negative for wound. Neurological:  Negative for dizziness and headaches. Hematological:  Negative for adenopathy. Does not bruise/bleed easily. Psychiatric/Behavioral:  Negative for confusion and depression. The patient is not nervous/anxious. Vital Signs: /67   Pulse 83   Temp 98.1 °F (36.7 °C) (Oral)   Resp 20   Ht 6' (1.829 m)   Wt 202 lb 2.6 oz (91.7 kg)   SpO2 100%   BMI 27.42 kg/m²      Physical Exam:  CONSTITUTIONAL: awake, alert, cooperative, no apparent distress   EYES: EOM grossly intact, no conjunctival pallor, no scleral icterus  ENT: Normocephalic, without obvious abnormality, atraumatic  NECK: supple, symmetrical, no jugular venous distension  HEMATOLOGIC/LYMPHATIC: no cervical, supraclavicular or axillary lymphadenopathy   LUNGS: CTA bilaterally, no wheezes/rhonchi/rales, unlabored on RA   CARDIOVASCULAR: regular rate and rhythm, normal S1 and S2, no murmur noted  ABDOMEN: Non-tender, non-distended, NABS  MUSCULOSKELETAL: full range of motion noted, tone is normal  NEUROLOGIC: awake, alert, oriented to name, place and time. Motor skills grossly intact.    SKIN: warm and dry, no jaundice, no bruising or petechiae.  EXTREMITIES: no peripheral edema, no clubbing or cyanosis     Labs:    Lab Results   Component Value Date    WBC 6.5 01/27/2023    HGB 8.2 (L) 01/27/2023    HCT 24.7 (L) 01/27/2023    MCV 92.2 01/27/2023    PLT 12 (LL) 01/27/2023    LYMPHOPCT 36.0 01/25/2023    RBC 2.68 (L) 01/27/2023    MCH 30.6 01/27/2023    MCHC 33.2 01/27/2023    RDW 17.8 (H) 01/27/2023     No results found for: INR, PROTIME  Lab Results   Component Value Date     01/27/2023    K 4.0 01/27/2023     01/27/2023    CO2 24 01/27/2023    BUN 22 01/27/2023    CREATININE 0.7 (L) 01/27/2023    GLUCOSE 124 (H) 01/27/2023    CALCIUM 8.9 01/27/2023    LABALBU 4.0 01/25/2023    BILITOT 0.8 01/25/2023    ALKPHOS 88 01/25/2023    AST 20 01/25/2023    ALT 12 01/25/2023    LABGLOM >60 01/27/2023    PHOS 2.5 01/27/2023    MG 2.3 01/27/2023     Lab Results   Component Value Date    ALKPHOS 88 01/25/2023    AST 20 01/25/2023     No results found for: URICACID  Lab Results   Component Value Date     (H) 01/27/2023     Lab Results   Component Value Date    IRON 49 (L) 01/27/2023    TIBC 202 (L) 01/27/2023    FERRITIN 1,904 (H) 01/27/2023       Imaging:  US ABDOMEN COMPLETE    (Results Pending)       Assessment/Plan:    Thrombocytopenia  -plt stable/slowly trending down to 12k, no active bleeding.  No baseline labs available locally or in care everywhere  -no e/o TTP, hemolytic anemia (LDH mildly elevated, retic normal)  -Concern for ITP.  Eval for underlying cause/secondary ITP:   -US liver/spleen pending   -HIV/Hepatitis/EBV pending  -Pt admits to drinking tonic water frequently, recommend cessation.  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 1g/kg IVIG x 1, Pulse dose steroid Dex 40mg x 4 days.  GI ppx while on steroid ordered.    Anemia  -Hgb 8.4, normocytic, unknown  baseline. No e/o active bleeding, normal retic not consistent with hemolysis  -Iron indices consistent with inflammation, ?viral illness  -Monitor for now, further workup can be considered after recovery from acute illness    Assessment and plan of care was developed in coordination with attending Dr. Jey Dietrich    I have independently evaluated and examined this patient today. I have reviewed radiologic and biochemical tests on this patient. Management Plan is developed mutually with HOUSTON Herman. I have reviewed above note and agree with assessment and plan    Gladis Quigley PA-C    Thank you for allowing me to participate in the care of this very pleasant patient. Labs, rationale, and plan of care all discussed in depth with patient and questions and concerns addressed.

## 2023-01-27 NOTE — PROGRESS NOTES
[place]2.0  [facility] [place]      Name:  Marta Cervantes /Age/Sex: 1960  (58 y.o. male)   MRN & CSN:  [id number] & [id number] Encounter Date/Time: 2023 10:12 AM EST    Location:  -A PCP: Chacha DAVE MD       Hospital Day: 2    Assessment and Plan:   Marta Cervantes is a 58 y.o. male  who presents with NSTEMI (non-ST elevated myocardial infarction) ([address]      Plan:    Chest pain  -Had positive enzymes. Some nonspecific T wave changes. Underwent cardiac catheterization. Revealed mild CAD. Did not require stenting.   -Chest pain likely from pericarditis given recent viral illness.  -Started on indomethacin. May need to discontinue if platelet count does not improve. Will discuss with cardiology. 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 No evidence of bleeding at this point observed in the hospital.  If his platelet count has hovered between 10 and 12 K. Given no bleeding he has not received any platelet transfusions.   -Started on high-dose steroids and IVIG as per heme-onc. Anemia  -Hemoglobin stable. No evidence of bleeding. Thyroid nodule  -Currently undergoing work-up outpatient      Comment: Please note this report has been produced using speech recognition software and may contain errors related to that system including errors in grammar, punctuation, and spelling, as well as words and phrases that may be inappropriate. If there are any questions or concerns please feel free to contact the dictating provider for clarification. Diet ADULT DIET; Regular; 4 carb choices (60 gm/meal);  Low Fat/Low Chol/High Fiber/2 gm Na   DVT Prophylaxis [] Lovenox, []  Heparin, [x] SCDs, [] Ambulation,  [] Eliquis, [] Xarelto  [] Coumadin   Code Status Full Code   Disposition From: Home  Expected Disposition: Home  Estimated Date of Discharge: 2 to 3 days  Patient requires continued admission due to treatment of ITP   Surrogate Decision Maker/ [facility] [address] [facility] [place]     Subjective:     Chief Complaint: Chest pain  Feels okay. Reports chest pain is almost completely subsided. However he has not gotten out of bed yet so he does not believe he will do it with exertion. Denies any shortness of breath. Denies any lightheadedness or dizziness. Denies any epistaxis, hematochezia or melena. Review of Systems:    Review of Systems  As per interval history    Objective:   No intake or output data in the 24 hours ending 01/27/23 1012     Vitals:   Vitals:    01/27/23 0834   BP:    Pulse: 83   Resp:    Temp:    SpO2:        Physical Exam:   Physical Exam  Constitutional:       General: He is not in acute distress. HENT:      Mouth/Throat:      Mouth: Mucous membranes are moist.      Pharynx: No posterior oropharyngeal erythema. Eyes:      General: No scleral icterus. Extraocular Movements: Extraocular movements intact. Pupils: Pupils are equal, round, and reactive to light. Cardiovascular:      Rate and Rhythm: Normal rate and regular rhythm. Pulses: Normal pulses. Heart sounds: No murmur heard. Pulmonary:      Effort: Pulmonary effort is normal.      Breath sounds: No wheezing or rales. Abdominal:      General: Bowel sounds are normal. There is no distension. Palpations: Abdomen is soft. Tenderness: There is no abdominal tenderness. Musculoskeletal:         General: Normal range of motion. Right lower leg: No edema. Left lower leg: No edema. Skin:     General: Skin is warm. Findings: No rash. Neurological:      General: No focal deficit present. Mental Status: He is alert and oriented to person, place, and time. Cranial Nerves: No cranial nerve deficit. Sensory: No sensory deficit. Motor: No weakness.    Psychiatric:         Mood and Affect: Mood normal.       Medications:   Medications:    immune globulin  90 g IntraVENous Once    dexamethasone  40 mg Oral Daily    famotidine  20 mg Oral BID insulin lispro  0-8 Units SubCUTAneous TID WC    insulin lispro  0-4 Units SubCUTAneous Nightly    sodium chloride flush  5-40 mL IntraVENous 2 times per day    atorvastatin  40 mg Oral Nightly    aspirin  81 mg Oral Daily    metoprolol tartrate  50 mg Oral BID    indomethacin  25 mg Oral TID WC    sodium chloride flush  5-40 mL IntraVENous 2 times per day      Infusions:    dextrose      sodium chloride      sodium chloride       PRN Meds: glucose, 4 tablet, PRN  dextrose bolus, 125 mL, PRN   Or  dextrose bolus, 250 mL, PRN  glucagon (rDNA), 1 mg, PRN  dextrose, , Continuous PRN  sodium chloride flush, 5-40 mL, PRN  sodium chloride, , PRN  ondansetron, 4 mg, Q8H PRN   Or  ondansetron, 4 mg, Q6H PRN  polyethylene glycol, 17 g, Daily PRN  acetaminophen, 650 mg, Q6H PRN   Or  acetaminophen, 650 mg, Q6H PRN  ibuprofen, 600 mg, Q6H PRN  sodium chloride flush, 5-40 mL, PRN  sodium chloride, , PRN  acetaminophen, 650 mg, Q4H PRN        Labs      Recent Results (from the past 24 hour(s))   Troponin    Collection Time: 01/26/23 10:25 AM   Result Value Ref Range    Troponin T 0.029 (H) <0.01 NG/ML   Cardiac Catheterization    Collection Time: 01/26/23 10:57 AM   Result Value Ref Range    Left Ventricular Ejection Fraction 60     LVEF MODALITY CATH    Troponin    Collection Time: 01/26/23  5:12 PM   Result Value Ref Range    Troponin T 0.033 (H) <0.01 NG/ML   Troponin    Collection Time: 01/26/23 11:57 PM   Result Value Ref Range    Troponin T 0.016 (H) <0.01 NG/ML   Troponin    Collection Time: 01/27/23  3:32 AM   Result Value Ref Range    Troponin T 0.022 (H) <0.01 NG/ML   CBC    Collection Time: 01/27/23  3:32 AM   Result Value Ref Range    WBC 6.5 4.0 - 10.5 K/CU MM    RBC 2.68 (L) 4.6 - 6.2 M/CU MM    Hemoglobin 8.2 (L) 13.5 - 18.0 GM/DL    Hematocrit 24.7 (L) 42 - 52 %    MCV 92.2 78 - 100 FL    MCH 30.6 27 - 31 PG    MCHC 33.2 32.0 - 36.0 %    RDW 17.8 (H) 11.7 - 14.9 %    Platelets 12 (LL) 093 - 440 K/CU MM   Basic Metabolic Panel    Collection Time: 01/27/23  3:32 AM   Result Value Ref Range    Sodium 137 135 - 145 MMOL/L    Potassium 4.0 3.5 - 5.1 MMOL/L    Chloride 105 99 - 110 mMol/L    CO2 24 21 - 32 MMOL/L    Anion Gap 8 4 - 16    BUN 22 6 - 23 MG/DL    Creatinine 0.7 (L) 0.9 - 1.3 MG/DL    Est, Glom Filt Rate >60 >60 mL/min/1.73m2    Glucose 124 (H) 70 - 99 MG/DL    Calcium 8.9 8.3 - 10.6 MG/DL   Magnesium    Collection Time: 01/27/23  3:32 AM   Result Value Ref Range    Magnesium 2.3 1.8 - 2.4 mg/dl   Phosphorus    Collection Time: 01/27/23  3:32 AM   Result Value Ref Range    Phosphorus 2.5 2.5 - 4.9 MG/DL        Imaging/Diagnostics Last 24 Hours   XR CHEST PORTABLE    Result Date: 1/25/2023  EXAMINATION: ONE XRAY VIEW OF THE CHEST 1/25/2023 7:03 pm COMPARISON: 12/13/2022 HISTORY: Acute chest pain and fever. FINDINGS: Cardiomediastinal silhouette at the upper limit of normal.  Possible emphysema. No acute airspace disease, pleural effusion, or pneumothorax. Evidence of chronic granulomatous disease. No acute abnormality. CTA PULMONARY W CONTRAST    Result Date: 1/25/2023  EXAMINATION: CTA OF THE CHEST 1/25/2023 8:13 pm TECHNIQUE: CTA of the chest was performed after the administration of 75 mL Isovue 370 intravenous contrast.  Multiplanar reformatted images are provided for review. MIP images are provided for review. Automated exposure control, iterative reconstruction, and/or weight based adjustment of the mA/kV was utilized to reduce the radiation dose to as low as reasonably achievable. COMPARISON: Correlation with concurrent chest radiograph HISTORY: Acute chest pain. FINDINGS: Image quality mildly degraded by motion artifact. Pulmonary Arteries: Pulmonary arteries are adequately opacified for evaluation. No intraluminal filling defect to suggest pulmonary embolism. Main pulmonary artery is normal in caliber. Mediastinum: Heart is not enlarged. Systemic and coronary atherosclerotic calcification.   At least moderate aortic valvular calcification. No pericardial effusion. Thoracic aorta is normal caliber. No lymphadenopathy. Calcified mediastinal and hilar lymph nodes, right greater than left. Thyroid and esophagus are unremarkable. Lungs/pleura: Mild dependent atelectasis. Mild scarring in both lungs. No acute airspace disease or pleural effusion. Scattered calcified granulomas. Upper Abdomen: Calcified granulomas within the spleen and liver. Scattered hypodense liver lesions are likely benign. Soft Tissues/Bones: No acute abnormality. Chronic fracture of the left 7th rib. No pulmonary embolism or acute pulmonary abnormality.        Electronically signed by Robel Nelson MD on 1/27/2023 at 10:12 AM

## 2023-01-27 NOTE — PLAN OF CARE
Problem: Discharge Planning  Goal: Discharge to home or other facility with appropriate resources  1/27/2023 0953 by Jeffrey Taveras RN  Outcome: Progressing  Flowsheets (Taken 1/27/2023 0801)  Discharge to home or other facility with appropriate resources:   Identify barriers to discharge with patient and caregiver   Arrange for needed discharge resources and transportation as appropriate   Identify discharge learning needs (meds, wound care, etc)  1/27/2023 0411 by Milana Ba RN  Outcome: Progressing     Problem: Safety - Adult  Goal: Free from fall injury  1/27/2023 0953 by Jeffrey Taveras RN  Outcome: Progressing  1/27/2023 0411 by Milana Ba RN  Outcome: Progressing

## 2023-01-27 NOTE — PROGRESS NOTES
Cardiology Progress Note     Admit Date:  1/26/2023    Consult reason/ Seen today for :       Subjective and  Overnight Events :  chest pain is better   But blood counts dramatically worse with hemoglobin of 8 and platelet count down to 12  Cath yesterday did not show any significant obstructive coronary artery disease  Echo showed normal EF with moderate mitral stenosis    Chief complain on admission : 58 y. o.year old who is admitted forNo chief complaint on file. Assessment / Plan:  Chest pain much improved on Indocin due to his low platelet counts and anemia we will stop Indocin today  Change colchicine as needed for chest pain and pericarditis  He is getting Decadron due to thrombocytopenia  Thrombocytopenia and anemia work-up as per primary team and hematology  Continue metoprolol 25 twice daily  Moderate mitral stenosis follow-up in office  DVT Prophylaxis if no contraindication  We will sign off call with questions    Past medical history:    has no past medical history on file. Past surgical history:   has a past surgical history that includes Appendectomy; knee surgery; and Ankle surgery (Left). Social History:   reports that he has quit smoking. His smoking use included cigarettes. He does not have any smokeless tobacco history on file. He reports current alcohol use of about 4.0 standard drinks per week. He reports that he does not use drugs. Family history:  family history is not on file.     No Known Allergies    Review of Systems:    All 14 systems were reviewed and are negative  Except for the positive findings  which as documented     /74   Pulse 78   Temp 98 °F (36.7 °C) (Oral)   Resp 25   Ht 6' (1.829 m)   Wt 202 lb 2.6 oz (91.7 kg)   SpO2 97%   BMI 27.42 kg/m²   No intake or output data in the 24 hours ending 01/27/23 1650  Physical Exam:  Constitutional:  Well developed, Well nourished, No acute distress, Non-toxic appearance. HENT:  Normocephalic, Atraumatic, Bilateral external ears normal, Oropharynx moist, No oral exudates, Nose normal. Neck- Normal range of motion, No tenderness, Supple, No stridor. Eyes:  PERRL, EOMI, Conjunctiva normal, No discharge. Respiratory:  Normal breath sounds, No respiratory distress, No wheezing, No chest tenderness. Cardiovascular:  Normal heart rate, Normal rhythm, No murmurs, No rubs, No gallops, JVP not elevated  Abdomen/GI:  Bowel sounds normal, Soft, No tenderness, No masses, No pulsatile masses. Musculoskeletal:  Intact distal pulses, No edema, No tenderness, No cyanosis, No clubbing. Good range of motion in all major joints. No tenderness to palpation or major deformities noted. Back- No tenderness. Integument:  Warm, Dry, No erythema, No rash. Lymphatic:  No lymphadenopathy noted. Neurologic:  Alert & oriented x 3, Normal motor function, Normal sensory function, No focal deficits noted.    Psychiatric:  Affect  and  Mood :no change    Medications:    dexamethasone  40 mg Oral Daily    famotidine  20 mg Oral BID    insulin lispro  0-8 Units SubCUTAneous TID WC    insulin lispro  0-4 Units SubCUTAneous Nightly    sodium chloride flush  5-40 mL IntraVENous 2 times per day    metoprolol tartrate  50 mg Oral BID    indomethacin  25 mg Oral TID WC    sodium chloride flush  5-40 mL IntraVENous 2 times per day      dextrose      sodium chloride      sodium chloride       glucose, dextrose bolus **OR** dextrose bolus, glucagon (rDNA), dextrose, sodium chloride flush, sodium chloride, ondansetron **OR** ondansetron, polyethylene glycol, acetaminophen **OR** acetaminophen, ibuprofen, sodium chloride flush, sodium chloride, acetaminophen    Lab Data:  CBC:   Recent Labs     01/25/23  1840 01/26/23  0652 01/27/23  0332   WBC 6.7 5.4 6.5   HGB 9.5* 8.2* 8.2*   HCT 29.0* 25.1* 24.7*   MCV 91.8 93.3 92.2   PLT 14* 11* 12*     BMP:   Recent Labs     01/25/23 1840 01/26/23  0652 01/27/23  0332   * 134* 137   K 4.1 3.7 4.0   CL 96* 103 105   CO2 24 22 24   PHOS  --  3.1 2.5   BUN 17 18 22   CREATININE 1.0 0.8* 0.7*     PT/INR: No results for input(s): PROTIME, INR in the last 72 hours. BNP:    Recent Labs     01/25/23  1840   PROBNP 1,459*     TROPONIN:   Recent Labs     01/26/23  1712 01/26/23  2357 01/27/23  0332   TROPONINT 0.033* 0.016* 0.022*        ECHO :   echocardiogram    Assessment:  58 y. o.year old who is admitted forNo chief complaint on file. , active issues as noted below:  Impression:  Principal Problem:    NSTEMI (non-ST elevated myocardial infarction) ([facility] [address]  Active Problems:    Precordial pain  Resolved Problems:    * No resolved hospital problems. *            All labs, medications and tests reviewed by myself , continue all other medications of all above medical condition listed as is except for changes mentioned above. Thank you very much for consult , please call with questions.     Rafal Mcfarlane MD, MD 1/27/2023 4:50 PM

## 2023-01-27 NOTE — PROGRESS NOTES
Physician Progress Note      Herbie Singer  CSN #:                  [id number]  :                       1960  ADMIT DATE:       2023 4:54 AM  DISCH DATE:  RESPONDING  PROVIDER #:        Moe Roca MD          QUERY TEXT:    Hospitalists,    Pt admitted with chest pain, suspected NSTEMI and found to have pericarditis. Patient noted to have SIRS. If possible, please document in the progress notes   and discharge summary if you are evaluating and /or treating any of the   following: The medical record reflects the following:  Risk Factors: recent viral illness  Clinical Indicators: suspected pericarditis due to recent viral illness, RR   >20 up to 32, HR > 90 up to 122, hypotension with SBP lowest 88/63, platelet   <91, troponin elevation blood cultures no growth  Treatment: labs, imaging, Cardiology consult, decadron    Thank you,  William Cooper RN CDS  [phone]  Options provided:  -- Sepsis, present on admission  -- Sepsis, present on admission, now resolved  -- Sepsis, developed following admission  -- Sepsis was ruled out  -- Other - I will add my own diagnosis  -- Disagree - Not applicable / Not valid  -- Disagree - Clinically unable to determine / Unknown  -- Refer to Clinical Documentation Reviewer    PROVIDER RESPONSE TEXT:    After further study, sepsis was ruled out for this patient. Query created by: Pao Sewell on 2023 3:06 PM      Electronically signed by:   Moe Roca MD 2023 3:43 PM

## 2023-01-27 NOTE — PROGRESS NOTES
Dr Geoffrey Reyes - oncology  paged via [facility] for new consult for persistent thrombocytopenia. suspect viral etiology.

## 2023-01-27 NOTE — PROGRESS NOTES
[facility] [place] [facility]  Alethea Spaulding  Phone: [phone]    Fax [phone]                  Omero Deluna MD, Iris Naylor MD, Cely Marcano MD, Moishe Fake, MD Randie Curt, MD Hardin Elam, MD Loralie Cushing, MD Dr. Tonie Dung MD Artemus Gitelman, KANDACE Tucker, KANDACE Pearson, KANDACE Banks, KANDACE Loco PA-C    CARDIOLOGY  NOTE      Name:  Anastasiia Meyers /Age/Sex: 1960  (58 y.o. male)   MRN & CSN:  [id number] & [id number] Admission Date/Time: 2023  4:54 AM   Location:  -A PCP: Rojean Crigler IM, MD       Hospital Day: 2    - Cardiology consult is for:  chest pain      ASSESSMENT/ PLAN:  Chest pain with elevated troponin  Likely pericarditis  -Chest pain has improved, troponin non-ACS  -C not showing any significant coronary artery disease  -Patient initiated on indomethacin, will discontinue due to thrombocytopenia  -Patient currently on decadron  Valvular heart disease  -Echo per below  -Moderate aortic stenosis  Sinus tachycardia   -In setting of significant anxiety  -Patient was placed on metoprolol yesterday  -HR significantly improved  Metabolic syndrome  -M4U of 6.3  Anemia and thrombocytopenia  -Hemoglobin of 8.2 which has dropped from 9.5 upon admission  -Platelet count of 18J.  -Hematology work-up        Echo: 2023   Left ventricular systolic function is hyperdynamic. Ejection fraction is visually estimated at 65-70%. Moderate aortic stenosis is present. Mean gradient of 27 mmHG   EJ of 1.73 Cm2   No evidence of any pericardial effusion. Dilated Ascending Aorta is 4.2 Cm    Subjective:  Mady Soulier is a 58 y. o.year old     Patient is resting comfortably in bed without any acute complaints at this time. Chest pain has significantly improved. Patient did endorse having recent viral illness.     He is receiving further work-up by hematology    Objective: Temperature:  Current - Temp: 98 °F (36.7 °C); Max - Temp  Av °F (36.7 °C)  Min: 97.9 °F (36.6 °C)  Max: 98.3 °F (36.8 °C)    Respiratory Rate : Resp  Av.4  Min: 15  Max: 25    Pulse Range: Pulse  Av.3  Min: 60  Max: 87    Blood Presuure Range:  Systolic (67KLC), NNQ:420 , Min:88 , CNX:744   ; Diastolic (45DVN), YU, Min:62, Max:91      Pulse ox Range: SpO2  Av.2 %  Min: 90 %  Max: 100 %    24hr I & O:  No intake or output data in the 24 hours ending 23 1602      /74   Pulse 78   Temp 98 °F (36.7 °C) (Oral)   Resp 25   Ht 6' (1.829 m)   Wt 202 lb 2.6 oz (91.7 kg)   SpO2 97%   BMI 27.42 kg/m²         TELEMETRY: Pacheco      has no past medical history on file. has a past surgical history that includes Appendectomy; knee surgery; and Ankle surgery (Left). Physical Exam  Constitutional:       General: He is not in acute distress. Appearance: He is not diaphoretic. HENT:      Head: Normocephalic and atraumatic. Right Ear: External ear normal.      Left Ear: External ear normal.      Nose: Nose normal.      Mouth/Throat:      Mouth: Mucous membranes are moist.   Eyes:      Extraocular Movements: Extraocular movements intact. Comments: Pupils equal and round   Neck:      Vascular: No carotid bruit. Cardiovascular:      Rate and Rhythm: Normal rate and regular rhythm. Pulses: Normal pulses. Heart sounds: S1 normal and S2 normal. No murmur heard. No friction rub. No gallop. Pulmonary:      Effort: Pulmonary effort is normal. No respiratory distress. Breath sounds: Normal breath sounds. No rales. Chest:      Chest wall: No tenderness. Abdominal:      Palpations: Abdomen is soft. Tenderness: There is no abdominal tenderness. Musculoskeletal:      Right lower leg: No edema. Left lower leg: No edema. Skin:     General: Skin is warm and dry.       Capillary Refill: Capillary refill takes less than 2 seconds. Findings: No rash. Comments: Skin turgor brisk   Neurological:      Mental Status: He is alert and oriented to person, place, and time. Mental status is at baseline. Psychiatric:         Behavior: Behavior is cooperative. Thought Content: Thought content normal.         Judgment: Judgment normal.        Medications:    dexamethasone  40 mg Oral Daily    famotidine  20 mg Oral BID    insulin lispro  0-8 Units SubCUTAneous TID WC    insulin lispro  0-4 Units SubCUTAneous Nightly    sodium chloride flush  5-40 mL IntraVENous 2 times per day    metoprolol tartrate  50 mg Oral BID    indomethacin  25 mg Oral TID WC    sodium chloride flush  5-40 mL IntraVENous 2 times per day      dextrose      sodium chloride      sodium chloride       glucose, dextrose bolus **OR** dextrose bolus, glucagon (rDNA), dextrose, sodium chloride flush, sodium chloride, ondansetron **OR** ondansetron, polyethylene glycol, acetaminophen **OR** acetaminophen, ibuprofen, sodium chloride flush, sodium chloride, acetaminophen    Lab Data:  CBC:   Recent Labs     01/25/23  1840 01/26/23  0652 01/27/23  0332   WBC 6.7 5.4 6.5   HGB 9.5* 8.2* 8.2*   HCT 29.0* 25.1* 24.7*   MCV 91.8 93.3 92.2   PLT 14* 11* 12*     BMP:   Recent Labs     01/25/23  1840 01/26/23  0652 01/27/23  0332   * 134* 137   K 4.1 3.7 4.0   CL 96* 103 105   CO2 24 22 24   PHOS  --  3.1 2.5   BUN 17 18 22   CREATININE 1.0 0.8* 0.7*     LIVER PROFILE:   Recent Labs     01/25/23  1840   AST 20   ALT 12   BILITOT 0.8   ALKPHOS 88     PT/INR: No results for input(s): PROTIME, INR in the last 72 hours. APTT: No results for input(s): APTT in the last 72 hours. BNP:  No results for input(s): BNP in the last 72 hours.   TROPONIN:   Recent Labs     01/26/23  1712 01/26/23  2357 01/27/23  0332   TROPONINT 0.033* 0.016* 0.022*     Labs, consult, tests reviewed          Chato Enciso PA-C, 1/27/2023 4:02 PM

## 2023-01-28 LAB
ANION GAP SERPL CALCULATED.3IONS-SCNC: 9 MMOL/L (ref 4–16)
BANDED NEUTROPHILS ABSOLUTE COUNT: 0.37 K/CU MM
BANDED NEUTROPHILS RELATIVE PERCENT: 7 % (ref 5–11)
BUN BLDV-MCNC: 23 MG/DL (ref 6–23)
CALCIUM SERPL-MCNC: 8.8 MG/DL (ref 8.3–10.6)
CHLORIDE BLD-SCNC: 108 MMOL/L (ref 99–110)
CO2: 22 MMOL/L (ref 21–32)
CREAT SERPL-MCNC: 0.7 MG/DL (ref 0.9–1.3)
DIFFERENTIAL TYPE: ABNORMAL
EBV EARLY ANTIGEN AB, IGG: 12.5 U/ML (ref 0–10.9)
EBV NUCLEAR AG AB: 87.3 U/ML (ref 0–21.9)
EPSTEIN-BARR VCA IGG: 232 U/ML (ref 0–21.9)
EPSTEIN-BARR VCA IGM: 14.1 U/ML (ref 0–43.9)
ERYTHROCYTE SEDIMENTATION RATE: 53 MM/HR (ref 0–20)
GFR SERPL CREATININE-BSD FRML MDRD: >60 ML/MIN/1.73M2
GLUCOSE BLD-MCNC: 134 MG/DL (ref 70–99)
GLUCOSE BLD-MCNC: 145 MG/DL (ref 70–99)
GLUCOSE BLD-MCNC: 159 MG/DL (ref 70–99)
GLUCOSE BLD-MCNC: 213 MG/DL (ref 70–99)
HAPTOGLOBIN: 304 MG/DL (ref 30–200)
HCT VFR BLD CALC: 23.8 % (ref 42–52)
HEMOGLOBIN: 7.7 GM/DL (ref 13.5–18)
LYMPHOCYTES ABSOLUTE: 1.3 K/CU MM
LYMPHOCYTES RELATIVE PERCENT: 25 % (ref 24–44)
MAGNESIUM: 2.3 MG/DL (ref 1.8–2.4)
MCH RBC QN AUTO: 30.4 PG (ref 27–31)
MCHC RBC AUTO-ENTMCNC: 32.4 % (ref 32–36)
MCV RBC AUTO: 94.1 FL (ref 78–100)
METAMYELOCYTES ABSOLUTE COUNT: 0.11 K/CU MM
METAMYELOCYTES PERCENT: 2 %
MONOCYTES ABSOLUTE: 2.1 K/CU MM
MONOCYTES RELATIVE PERCENT: 38 % (ref 0–4)
MYELOCYTE PERCENT: 1 %
MYELOCYTES ABSOLUTE COUNT: 0.05 K/CU MM
OVALOCYTES: ABNORMAL
PDW BLD-RTO: 18 % (ref 11.7–14.9)
PHOSPHORUS: 2.9 MG/DL (ref 2.5–4.9)
PLATELET # BLD: 11 K/CU MM (ref 140–440)
POTASSIUM SERPL-SCNC: 4.2 MMOL/L (ref 3.5–5.1)
RBC # BLD: 2.53 M/CU MM (ref 4.6–6.2)
SEGMENTED NEUTROPHILS ABSOLUTE COUNT: 1.4 K/CU MM
SEGMENTED NEUTROPHILS RELATIVE PERCENT: 27 % (ref 36–66)
SODIUM BLD-SCNC: 139 MMOL/L (ref 135–145)
WBC # BLD: 5.3 K/CU MM (ref 4–10.5)

## 2023-01-28 PROCEDURE — 99232 SBSQ HOSP IP/OBS MODERATE 35: CPT | Performed by: INTERNAL MEDICINE

## 2023-01-28 PROCEDURE — 94761 N-INVAS EAR/PLS OXIMETRY MLT: CPT

## 2023-01-28 PROCEDURE — 6360000002 HC RX W HCPCS: Performed by: PHYSICIAN ASSISTANT

## 2023-01-28 PROCEDURE — 6360000002 HC RX W HCPCS: Performed by: INTERNAL MEDICINE

## 2023-01-28 PROCEDURE — 6370000000 HC RX 637 (ALT 250 FOR IP): Performed by: PHYSICIAN ASSISTANT

## 2023-01-28 PROCEDURE — 6370000000 HC RX 637 (ALT 250 FOR IP): Performed by: INTERNAL MEDICINE

## 2023-01-28 PROCEDURE — 80048 BASIC METABOLIC PNL TOTAL CA: CPT

## 2023-01-28 PROCEDURE — 2580000003 HC RX 258: Performed by: INTERNAL MEDICINE

## 2023-01-28 PROCEDURE — 83735 ASSAY OF MAGNESIUM: CPT

## 2023-01-28 PROCEDURE — 2060000000 HC ICU INTERMEDIATE R&B

## 2023-01-28 PROCEDURE — 85027 COMPLETE CBC AUTOMATED: CPT

## 2023-01-28 PROCEDURE — 82962 GLUCOSE BLOOD TEST: CPT

## 2023-01-28 PROCEDURE — 85007 BL SMEAR W/DIFF WBC COUNT: CPT

## 2023-01-28 PROCEDURE — 84100 ASSAY OF PHOSPHORUS: CPT

## 2023-01-28 RX ADMIN — DEXAMETHASONE 40 MG: 4 TABLET ORAL at 09:09

## 2023-01-28 RX ADMIN — FAMOTIDINE 20 MG: 20 TABLET ORAL at 09:11

## 2023-01-28 RX ADMIN — Medication 10 ML: at 11:05

## 2023-01-28 RX ADMIN — Medication 10 ML: at 20:06

## 2023-01-28 RX ADMIN — METOPROLOL TARTRATE 50 MG: 50 TABLET, FILM COATED ORAL at 20:06

## 2023-01-28 RX ADMIN — FAMOTIDINE 20 MG: 20 TABLET ORAL at 20:06

## 2023-01-28 RX ADMIN — Medication 10 ML: at 11:06

## 2023-01-28 RX ADMIN — IMMUNE GLOBULIN (HUMAN) 90 G: 10 INJECTION INTRAVENOUS; SUBCUTANEOUS at 11:39

## 2023-01-28 RX ADMIN — Medication 10 ML: at 20:07

## 2023-01-28 RX ADMIN — METOPROLOL TARTRATE 50 MG: 50 TABLET, FILM COATED ORAL at 09:11

## 2023-01-28 ASSESSMENT — PAIN SCALES - GENERAL: PAINLEVEL_OUTOF10: 0

## 2023-01-28 NOTE — PROGRESS NOTES
V2.0  [facility] Hospitalist Progress Note      Name:  Anastasiia Meyers /Age/Sex: 1960  (58 y.o. male)   MRN & CSN:  [id number] & [id number] Encounter Date/Time: 2023 10:12 AM EST    Location:  -A PCP: Rojean Crigler IM, MD       Hospital Day: 3    Assessment and Plan:   Anastasiia Meyers is a 58 y.o. male  who presents with NSTEMI (non-ST elevated myocardial infarction) ([facility] [address]      Plan:    Chest pain  -Had positive enzymes. Some nonspecific T wave changes. Underwent cardiac catheterization. Revealed mild CAD. Did not require stenting.   -Chest pain likely from pericarditis given recent viral illness.  -Started on indomethacin, but now discontinued due to low platelets. Unable to use cholestyramine due to risk of further bone marrow suppression patient without any chest pain now. 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 No evidence of bleeding at this point observed in the hospital.  If his platelet count has hovered between 10 and 12 K. Given no bleeding he has not received any platelet transfusions.   -Started on high-dose steroids and IVIG as per heme-onc. As per heme-onc give additional dose of IVIG today.  -We will need follow-up with his heme-onc as outpatient for repeat blood counts. Anemia  -Unclear baseline. Hemoglobin stable. No evidence of bleeding. Thyroid nodule  -Currently undergoing work-up outpatient      Comment: Please note this report has been produced using speech recognition software and may contain errors related to that system including errors in grammar, punctuation, and spelling, as well as words and phrases that may be inappropriate. If there are any questions or concerns please feel free to contact the dictating provider for clarification. Diet ADULT DIET; Regular; 4 carb choices (60 gm/meal);  Low Fat/Low Chol/High Fiber/2 gm Na   DVT Prophylaxis [] Lovenox, []  Heparin, [x] SCDs, [] Ambulation,  [] Eliquis, [] Xarelto  [] Coumadin Code Status Full Code   Disposition From: Home  Expected Disposition: Home  Estimated Date of Discharge: 24 hours days  Patient requires continued admission due to treatment of ITP, awaiting improvement in platelet count   Surrogate Decision Maker/ MRALA Corley     Subjective:     Chief Complaint: Chest pain  Feels okay. Denies any chest pain this morning. Denies any lightheadedness or dizziness. Denies any epistaxis, hematochezia or melena. Review of Systems:    Review of Systems  As per interval history    Objective:   No intake or output data in the 24 hours ending 01/28/23 1344     Vitals:   Vitals:    01/28/23 1218   BP: (!) 102/56   Pulse: 71   Resp: 21   Temp: 98.1 °F (36.7 °C)   SpO2:        Physical Exam:   Physical Exam  Constitutional:       General: He is not in acute distress. HENT:      Mouth/Throat:      Mouth: Mucous membranes are moist.      Pharynx: No posterior oropharyngeal erythema. Eyes:      General: No scleral icterus. Extraocular Movements: Extraocular movements intact. Pupils: Pupils are equal, round, and reactive to light. Cardiovascular:      Rate and Rhythm: Normal rate and regular rhythm. Pulses: Normal pulses. Heart sounds: No murmur heard. Pulmonary:      Effort: Pulmonary effort is normal.      Breath sounds: No wheezing or rales. Abdominal:      General: Bowel sounds are normal. There is no distension. Palpations: Abdomen is soft. Tenderness: There is no abdominal tenderness. Musculoskeletal:         General: Normal range of motion. Right lower leg: No edema. Left lower leg: No edema. Skin:     General: Skin is warm. Findings: No rash. Neurological:      General: No focal deficit present. Mental Status: He is alert and oriented to person, place, and time. Cranial Nerves: No cranial nerve deficit. Sensory: No sensory deficit. Motor: No weakness.    Psychiatric:         Mood and Affect: Mood normal.       Medications:   Medications:    dexamethasone  40 mg Oral Daily    famotidine  20 mg Oral BID    insulin lispro  0-8 Units SubCUTAneous TID WC    insulin lispro  0-4 Units SubCUTAneous Nightly    sodium chloride flush  5-40 mL IntraVENous 2 times per day    metoprolol tartrate  50 mg Oral BID    sodium chloride flush  5-40 mL IntraVENous 2 times per day      Infusions:    dextrose      sodium chloride      sodium chloride       PRN Meds: glucose, 4 tablet, PRN  dextrose bolus, 125 mL, PRN   Or  dextrose bolus, 250 mL, PRN  glucagon (rDNA), 1 mg, PRN  dextrose, , Continuous PRN  sodium chloride flush, 5-40 mL, PRN  sodium chloride, , PRN  ondansetron, 4 mg, Q8H PRN   Or  ondansetron, 4 mg, Q6H PRN  polyethylene glycol, 17 g, Daily PRN  acetaminophen, 650 mg, Q6H PRN   Or  acetaminophen, 650 mg, Q6H PRN  sodium chloride flush, 5-40 mL, PRN  sodium chloride, , PRN  acetaminophen, 650 mg, Q4H PRN      Labs      Recent Results (from the past 24 hour(s))   POCT Glucose    Collection Time: 01/27/23  5:45 PM   Result Value Ref Range    POC Glucose 118 (H) 70 - 99 MG/DL   C-Reactive Protein    Collection Time: 01/27/23  9:30 PM   Result Value Ref Range    CRP High Sensitivity 144.5 (H) <5.0 mg/L   Sedimentation Rate    Collection Time: 01/27/23  9:30 PM   Result Value Ref Range    Sed Rate 53 (H) 0 - 20 MM/HR   Basic Metabolic Panel    Collection Time: 01/28/23  3:27 AM   Result Value Ref Range    Sodium 139 135 - 145 MMOL/L    Potassium 4.2 3.5 - 5.1 MMOL/L    Chloride 108 99 - 110 mMol/L    CO2 22 21 - 32 MMOL/L    Anion Gap 9 4 - 16    BUN 23 6 - 23 MG/DL    Creatinine 0.7 (L) 0.9 - 1.3 MG/DL    Est, Glom Filt Rate >60 >60 mL/min/1.73m2    Glucose 213 (H) 70 - 99 MG/DL    Calcium 8.8 8.3 - 10.6 MG/DL   Magnesium    Collection Time: 01/28/23  3:27 AM   Result Value Ref Range    Magnesium 2.3 1.8 - 2.4 mg/dl   Phosphorus    Collection Time: 01/28/23  3:27 AM   Result Value Ref Range    Phosphorus 2.9 2.5 - 4.9 MG/DL   CBC with Auto Differential    Collection Time: 01/28/23  3:27 AM   Result Value Ref Range    WBC 5.3 4.0 - 10.5 K/CU MM    RBC 2.53 (L) 4.6 - 6.2 M/CU MM    Hemoglobin 7.7 (L) 13.5 - 18.0 GM/DL    Hematocrit 23.8 (L) 42 - 52 %    MCV 94.1 78 - 100 FL    MCH 30.4 27 - 31 PG    MCHC 32.4 32.0 - 36.0 %    RDW 18.0 (H) 11.7 - 14.9 %    Platelets 11 (LL) 932 - 440 K/CU MM    Myelocyte Percent 1 (H) 0.0 %    Metamyelocytes Relative 2 (H) 0.0 %    Bands Relative 7 5 - 11 %    Segs Relative 27.0 (L) 36 - 66 %    Lymphocytes % 25.0 24 - 44 %    Monocytes % 38.0 (H) 0 - 4 %    Myelocytes Absolute 0.05 K/CU MM    Metamyelocytes Absolute 0.11 K/CU MM    Bands Absolute 0.37 K/CU MM    Segs Absolute 1.4 K/CU MM    Lymphocytes Absolute 1.3 K/CU MM    Monocytes Absolute 2.1 K/CU MM    Differential Type MANUAL DIFFERENTIAL     Ovalocytes 1+    POCT Glucose    Collection Time: 01/28/23 12:43 PM   Result Value Ref Range    POC Glucose 145 (H) 70 - 99 MG/DL        Imaging/Diagnostics Last 24 Hours   XR CHEST PORTABLE    Result Date: 1/25/2023  EXAMINATION: ONE XRAY VIEW OF THE CHEST 1/25/2023 7:03 pm COMPARISON: 12/13/2022 HISTORY: Acute chest pain and fever. FINDINGS: Cardiomediastinal silhouette at the upper limit of normal.  Possible emphysema. No acute airspace disease, pleural effusion, or pneumothorax. Evidence of chronic granulomatous disease. No acute abnormality. CTA PULMONARY W CONTRAST    Result Date: 1/25/2023  EXAMINATION: CTA OF THE CHEST 1/25/2023 8:13 pm TECHNIQUE: CTA of the chest was performed after the administration of 75 mL Isovue 370 intravenous contrast.  Multiplanar reformatted images are provided for review. MIP images are provided for review. Automated exposure control, iterative reconstruction, and/or weight based adjustment of the mA/kV was utilized to reduce the radiation dose to as low as reasonably achievable.  COMPARISON: Correlation with concurrent chest radiograph HISTORY: Acute chest pain. FINDINGS: Image quality mildly degraded by motion artifact. Pulmonary Arteries: Pulmonary arteries are adequately opacified for evaluation. No intraluminal filling defect to suggest pulmonary embolism. Main pulmonary artery is normal in caliber. Mediastinum: Heart is not enlarged. Systemic and coronary atherosclerotic calcification. At least moderate aortic valvular calcification. No pericardial effusion. Thoracic aorta is normal caliber. No lymphadenopathy. Calcified mediastinal and hilar lymph nodes, right greater than left. Thyroid and esophagus are unremarkable. Lungs/pleura: Mild dependent atelectasis. Mild scarring in both lungs. No acute airspace disease or pleural effusion. Scattered calcified granulomas. Upper Abdomen: Calcified granulomas within the spleen and liver. Scattered hypodense liver lesions are likely benign. Soft Tissues/Bones: No acute abnormality. Chronic fracture of the left 7th rib. No pulmonary embolism or acute pulmonary abnormality.        Electronically signed by Kim Anand MD on 1/28/2023 at 1:44 PM

## 2023-01-28 NOTE — PROGRESS NOTES
Hematology/Oncology   Progress Note    Patient Name:  Camron Toussaint  Patient :  1960  Patient MRN:  [id number]     Primary Oncologist:   PCP: Damian Valentino MD     Date of Service: 2023      HPI:   Camron Toussaint is a pleasant 58 y.o. male with a history of chronic neck pain and no other significant health history who presented initially to the [facility] ED with complaints of worsening chest pressure. He was found to have troponin elevation and is s/p cardiac cath with no clinically significant blockages found. His plt were noted to be markedly decreased at 12k. He does not notice any spontaneous bruising or bleeding however has noticed few red marks that have resolved on their own. Hgb 8.2, unknown baseline. No s/s of active bleeding. He is also noted to have fever and myalgias with general malaise. No significant cough or other URI symptoms. No N/V. No recent unexplained weight loss or drenching night sweats. No change in bowel or bladder habits     I visited him. No acute complaint. He wants to go home. No active bleeding. No petechial rash. Bruises from needle stick. 2023 fist dose of IVIG, 1 gm/kg for 2 d. Pulse dex 40 mg for 4 d. Will give second dose of IVIG. Discussed with pharmacist.  2023 WBC 5.3, Hg 7.7 plat 11. Direct Marc negative. Retic wnl. . LFTs wnl. EBV IgG 232. Acute hep serology non reactive.  Sed rate 53.    23   Ferritin: 1,904 (H)  Iron: 49 (L)  UIBC: 153  TIBC: 202 (L)  Transferrin %: 24  FOLATE, FOLAT: 7.9  Vitamin B-12: 1681 (H)    CBC in AM.    Vital Signs: /64   Pulse 81   Temp 97.8 °F (36.6 °C) (Oral)   Resp 16   Ht 6' (1.829 m)   Wt 202 lb 2.6 oz (91.7 kg)   SpO2 99%   BMI 27.42 kg/m²      Physical Exam:  CONSTITUTIONAL: awake, alert, cooperative, no apparent distress   EYES: EOM grossly intact, + pallor, no scleral icterus  ENT: Normocephalic, without obvious abnormality, atraumatic  NECK: supple, symmetrical, no jugular venous distension  HEMATOLOGIC/LYMPHATIC: no cervical, supraclavicular or axillary lymphadenopathy   LUNGS: CTA bilaterally, no wheezes/rhonchi/rales, unlabored on RA   CARDIOVASCULAR: regular rate and rhythm, normal S1 and S2, no murmur noted  ABDOMEN: Non-tender, non-distended, NABS  MUSCULOSKELETAL: full range of motion noted, tone is normal  NEUROLOGIC: awake, alert, oriented to name, place and time. Motor skills grossly intact. SKIN: warm and dry, no jaundice, no bruising or petechiae. EXTREMITIES: no peripheral edema, no clubbing or cyanosis     Labs:    Lab Results   Component Value Date    WBC 5.3 01/28/2023    HGB 7.7 (L) 01/28/2023    HCT 23.8 (L) 01/28/2023    MCV 94.1 01/28/2023    PLT 11 (LL) 01/28/2023    LYMPHOPCT 25.0 01/28/2023    RBC 2.53 (L) 01/28/2023    MCH 30.4 01/28/2023    MCHC 32.4 01/28/2023    RDW 18.0 (H) 01/28/2023     No results found for: INR, PROTIME  Lab Results   Component Value Date     01/28/2023    K 4.2 01/28/2023     01/28/2023    CO2 22 01/28/2023    BUN 23 01/28/2023    CREATININE 0.7 (L) 01/28/2023    GLUCOSE 213 (H) 01/28/2023    CALCIUM 8.8 01/28/2023    LABALBU 4.0 01/25/2023    BILITOT 0.8 01/25/2023    ALKPHOS 88 01/25/2023    AST 20 01/25/2023    ALT 12 01/25/2023    LABGLOM >60 01/28/2023    PHOS 2.9 01/28/2023    MG 2.3 01/28/2023    POCGLU 118 (H) 01/27/2023     Lab Results   Component Value Date    ALKPHOS 88 01/25/2023    AST 20 01/25/2023     No results found for: URICACID  Lab Results   Component Value Date     (H) 01/27/2023     Lab Results   Component Value Date    IRON 49 (L) 01/27/2023    TIBC 202 (L) 01/27/2023    FERRITIN 1,904 (H) 01/27/2023       Imaging:  US ABDOMEN COMPLETE   Final Result   Hepatomegaly and hepatic steatosis. No splenomegaly. Assessment/Plan: Thrombocytopenia  -plt stable/slowly trending down to 12k, no active bleeding.   No baseline labs available locally or in care everywhere  -no e/o TTP, hemolytic anemia (LDH mildly elevated, retic normal)  -Concern for ITP. Eval for underlying cause/secondary ITP:   -US liver/spleen pending   -HIV/Hepatitis/EBV pending  -Pt admits to drinking tonic water frequently, recommend cessation. 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 1g/kg IVIG x 2, Pulse dose steroid Dex 40mg x 4 days. GI ppx while on steroid ordered. No active bleeding. Will continue with current treatment. If plat <10 or active bleeding please transfuse platelet. Anemia  -Hgb 8.4, normocytic, unknown baseline. No e/o active bleeding, normal retic not consistent with hemolysis  -Iron indices consistent with inflammation, ?viral illness  -Monitor for now, further workup can be considered after recovery from acute illness    If he is discharged, I recommend to follow up at cancer ctr. Chichi Pacheco MD    Thank you for allowing me to participate in the care of this very pleasant patient. Labs, rationale, and plan of care all discussed in depth with patient and questions and concerns addressed.

## 2023-01-29 VITALS
OXYGEN SATURATION: 97 % | DIASTOLIC BLOOD PRESSURE: 88 MMHG | WEIGHT: 202.16 LBS | BODY MASS INDEX: 27.38 KG/M2 | RESPIRATION RATE: 20 BRPM | HEART RATE: 85 BPM | HEIGHT: 72 IN | SYSTOLIC BLOOD PRESSURE: 118 MMHG | TEMPERATURE: 98.2 F

## 2023-01-29 LAB
ANISOCYTOSIS: ABNORMAL
BANDED NEUTROPHILS ABSOLUTE COUNT: 0.86 K/CU MM
BANDED NEUTROPHILS RELATIVE PERCENT: 9 % (ref 5–11)
BASOPHILIC STIPPLING: PRESENT
BASOPHILS ABSOLUTE: 0.1 K/CU MM
BASOPHILS RELATIVE PERCENT: 1 % (ref 0–1)
CULTURE: NORMAL
CULTURE: NORMAL
DIFFERENTIAL TYPE: ABNORMAL
GLUCOSE BLD-MCNC: 105 MG/DL (ref 70–99)
HCT VFR BLD CALC: 21.3 % (ref 42–52)
HEMOGLOBIN: 7 GM/DL (ref 13.5–18)
LYMPHOCYTES ABSOLUTE: 2.3 K/CU MM
LYMPHOCYTES RELATIVE PERCENT: 24 % (ref 24–44)
Lab: NORMAL
Lab: NORMAL
MCH RBC QN AUTO: 31.3 PG (ref 27–31)
MCHC RBC AUTO-ENTMCNC: 32.9 % (ref 32–36)
MCV RBC AUTO: 95.1 FL (ref 78–100)
METAMYELOCYTES ABSOLUTE COUNT: 0.86 K/CU MM
METAMYELOCYTES PERCENT: 9 %
MONOCYTES ABSOLUTE: 2.9 K/CU MM
MONOCYTES RELATIVE PERCENT: 30 % (ref 0–4)
MYELOCYTE PERCENT: 3 %
MYELOCYTES ABSOLUTE COUNT: 0.29 K/CU MM
NUCLEATED RED BLOOD CELLS: 3
OVALOCYTES: ABNORMAL
PDW BLD-RTO: 18.5 % (ref 11.7–14.9)
PLATELET # BLD: 23 K/CU MM (ref 140–440)
POLYCHROMASIA: ABNORMAL
RBC # BLD: 2.24 M/CU MM (ref 4.6–6.2)
SEGMENTED NEUTROPHILS ABSOLUTE COUNT: 1.9 K/CU MM
SEGMENTED NEUTROPHILS RELATIVE PERCENT: 20 % (ref 36–66)
SPECIMEN: NORMAL
SPECIMEN: NORMAL
UNDIFFERENTIATED CELL: 4 %
WBC # BLD: 9.5 K/CU MM (ref 4–10.5)
WBC # BLD: ABNORMAL 10*3/UL

## 2023-01-29 PROCEDURE — 2580000003 HC RX 258: Performed by: INTERNAL MEDICINE

## 2023-01-29 PROCEDURE — 85007 BL SMEAR W/DIFF WBC COUNT: CPT

## 2023-01-29 PROCEDURE — 85027 COMPLETE CBC AUTOMATED: CPT

## 2023-01-29 PROCEDURE — 99232 SBSQ HOSP IP/OBS MODERATE 35: CPT | Performed by: INTERNAL MEDICINE

## 2023-01-29 PROCEDURE — 2580000003 HC RX 258: Performed by: STUDENT IN AN ORGANIZED HEALTH CARE EDUCATION/TRAINING PROGRAM

## 2023-01-29 PROCEDURE — 6360000002 HC RX W HCPCS: Performed by: STUDENT IN AN ORGANIZED HEALTH CARE EDUCATION/TRAINING PROGRAM

## 2023-01-29 PROCEDURE — 6370000000 HC RX 637 (ALT 250 FOR IP): Performed by: INTERNAL MEDICINE

## 2023-01-29 PROCEDURE — 82962 GLUCOSE BLOOD TEST: CPT

## 2023-01-29 PROCEDURE — 6370000000 HC RX 637 (ALT 250 FOR IP): Performed by: PHYSICIAN ASSISTANT

## 2023-01-29 RX ORDER — METOPROLOL TARTRATE 50 MG/1
50 TABLET, FILM COATED ORAL 2 TIMES DAILY
Qty: 60 TABLET | Refills: 1 | Status: SHIPPED | OUTPATIENT
Start: 2023-01-29

## 2023-01-29 RX ADMIN — METOPROLOL TARTRATE 50 MG: 50 TABLET, FILM COATED ORAL at 09:48

## 2023-01-29 RX ADMIN — ACETAMINOPHEN 650 MG: 325 TABLET ORAL at 03:29

## 2023-01-29 RX ADMIN — FAMOTIDINE 20 MG: 20 TABLET ORAL at 09:48

## 2023-01-29 RX ADMIN — DEXAMETHASONE SODIUM PHOSPHATE 40 MG: 4 INJECTION INTRA-ARTICULAR; INTRALESIONAL; INTRAMUSCULAR; INTRAVENOUS; SOFT TISSUE at 09:47

## 2023-01-29 RX ADMIN — Medication 10 ML: at 09:54

## 2023-01-29 ASSESSMENT — PAIN SCALES - GENERAL
PAINLEVEL_OUTOF10: 0
PAINLEVEL_OUTOF10: 0
PAINLEVEL_OUTOF10: 3

## 2023-01-29 NOTE — DISCHARGE INSTRUCTIONS
Please follow-upwith Dr. Aaron Hraper to get repeat blood work to make sure your hemoglobin and your platelets are improving.

## 2023-01-29 NOTE — PROGRESS NOTES
Hematology/Oncology   Progress Note    Patient Name:  Anastasiia Meyers  Patient :  1960  Patient MRN:  [id number]     Primary Oncologist:   PCP: Lauren Heart MD     Date of Service: 2023      HPI:   Anastasiia Meyers is a pleasant 58 y.o. male with a history of chronic neck pain and no other significant health history who presented initially to the [facility] ED with complaints of worsening chest pressure. He was found to have troponin elevation and is s/p cardiac cath with no clinically significant blockages found. His plt were noted to be markedly decreased at 12k. He does not notice any spontaneous bruising or bleeding however has noticed few red marks that have resolved on their own. Hgb 8.2, unknown baseline. No s/s of active bleeding. He is also noted to have fever and myalgias with general malaise. No significant cough or other URI symptoms. No N/V. No recent unexplained weight loss or drenching night sweats. No change in bowel or bladder habits     I visited him. No acute complaint. He wants to go home. No active bleeding. No petechial rash. Bruises from needle stick. 2023 fist dose of IVIG, 1 gm/kg for 2 d. Pulse dex 40 mg for 4 d. Will give second dose of IVIG. Discussed with pharmacist.  2023 WBC 5.3, Hg 7.7 plat 11. Direct Marc negative. Retic wnl. . LFTs wnl. EBV IgG 232. Acute hep serology non reactive. Sed rate 53.    23 Ferritin: 1,904 (H) Iron: 49 (L) TIBC: 202 (L) Transferrin %: 24  FOLATE: 7.9 Vitamin B-12: 1681 (H)    2023 s/p 2 D of IVIG. He will continue wit pulse dex. Peripheral smear showed 4% undifferentiated cell. I added flow and BCR Abs study. Anemia could be hemodilutional.  He will be discharged today, I recommend to follow up with cancer ctr. No acute complaints. No active bleeding.     Vital Signs: /88   Pulse 85   Temp 98.2 °F (36.8 °C) (Oral)   Resp 20   Ht 6' (1.829 m)   Wt 202 lb 2.6 oz (91.7 kg)   SpO2 97% BMI 27.42 kg/m²      Physical Exam:  CONSTITUTIONAL: awake, alert, cooperative, no apparent distress   EYES: EOM grossly intact, + pallor, no scleral icterus  ENT: Normocephalic, without obvious abnormality, atraumatic  NECK: supple, symmetrical, no jugular venous distension  HEMATOLOGIC/LYMPHATIC: no cervical, supraclavicular or axillary lymphadenopathy   LUNGS: CTA bilaterally, no wheezes/rhonchi/rales, unlabored on RA   CARDIOVASCULAR: regular rate and rhythm, normal S1 and S2, no murmur   ABDOMEN: Non-tender, non-distended, NABS  MUSCULOSKELETAL: full range of motion noted, tone is normal  NEUROLOGIC: Motor skills grossly intact. CN II - XII grossly intact. SKIN: warm and dry, no jaundice, no bruising or petechiae.   EXTREMITIES: no peripheral edema, no clubbing or cyanosis     Labs:    Lab Results   Component Value Date    WBC 9.5 01/29/2023    HGB 7.0 (L) 01/29/2023    HCT 21.3 (L) 01/29/2023    MCV 95.1 01/29/2023    PLT 23 (L) 01/29/2023    LYMPHOPCT 24.0 01/29/2023    RBC 2.24 (L) 01/29/2023    MCH 31.3 (H) 01/29/2023    MCHC 32.9 01/29/2023    RDW 18.5 (H) 01/29/2023     No results found for: INR, PROTIME  Lab Results   Component Value Date     01/28/2023    K 4.2 01/28/2023     01/28/2023    CO2 22 01/28/2023    BUN 23 01/28/2023    CREATININE 0.7 (L) 01/28/2023    GLUCOSE 213 (H) 01/28/2023    CALCIUM 8.8 01/28/2023    LABALBU 4.0 01/25/2023    BILITOT 0.8 01/25/2023    ALKPHOS 88 01/25/2023    AST 20 01/25/2023    ALT 12 01/25/2023    LABGLOM >60 01/28/2023    PHOS 2.9 01/28/2023    MG 2.3 01/28/2023    POCGLU 105 (H) 01/29/2023     Lab Results   Component Value Date    ALKPHOS 88 01/25/2023    AST 20 01/25/2023     No results found for: URICACID  Lab Results   Component Value Date     (H) 01/27/2023     Lab Results   Component Value Date    IRON 49 (L) 01/27/2023    TIBC 202 (L) 01/27/2023    FERRITIN 1,904 (H) 01/27/2023       Imaging:  US ABDOMEN COMPLETE   Final Result Hepatomegaly and hepatic steatosis. No splenomegaly. Assessment/Plan: Thrombocytopenia  -plt stable/slowly trending down to 12k, no active bleeding. No baseline labs available locally or in care everywhere  -no e/o TTP, hemolytic anemia (LDH mildly elevated, retic normal)  -Concern for ITP. Eval for underlying cause/secondary ITP:   -US liver/spleen pending   -HIV/Hepatitis/EBV pending  -Pt admits to drinking tonic water frequently, recommend cessation. 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 1g/kg IVIG x 2, Pulse dose steroid Dex 40mg x 4 days. GI ppx while on steroid ordered. No active bleeding. Will continue with current treatment. If plat <10 or active bleeding please transfuse platelet. Anemia  -Hgb 8.4, normocytic, unknown baseline. No e/o active bleeding, normal retic not consistent with hemolysis  -Iron indices consistent with inflammation, ?viral illness  -Monitor for now, further workup can be considered after recovery from acute illness    1/29/2023 s/p 2 D of IVIG. He will continue wit pulse dex. Peripheral smear showed 4% undifferentiated cell. I added flow and BCR Abs study. Anemia could be hemodilutional.  He will be discharged today, I recommend to follow up with cancer ctr. If he is discharged, I recommend to follow up at cancer ctr. Sue Witt MD    Thank you for allowing me to participate in the care of this very pleasant patient. Labs, rationale, and plan of care all discussed in depth with patient and questions and concerns addressed.

## 2023-01-29 NOTE — PROGRESS NOTES
Discharge instructions covered with  pt. Verbalized understanding of instructions. Copy given to patient. IV discontinued per protocol.   Taken off unit via w/c to go home

## 2023-01-29 NOTE — DISCHARGE SUMMARY
V2.0  Discharge Summary    Name:  Charyl Nyhan /Age/Sex: 1960 (58 y.o. male)   Admit Date: 2023  Discharge Date: 23    MRN & CSN:  [id number] & [id number] Encounter Date and Time 23 2:00 PM EST    Attending:  No att. providers found Discharging Provider: Olya Rg MD       Hospital Course:     Brief HPI:The patient presented with pressure-like sensation in the chest associated with nausea and vomiting as well as mild shortness of breath with significant relief of the symptoms after use of ibuprofen at. Patient denies any history of heart problems family history of heart disease smoking diabetes hypertension hyperlipidemia. Patient was found to be mildly febrile with associated symptoms of myalgias and generalized achiness. Denies any sick exposure. Brief Problem Based Course:     Chest pain  -Had positive enzymes. Some nonspecific T wave changes. Underwent cardiac catheterization. Revealed mild CAD. Did not require stenting.   -Chest pain likely from pericarditis given recent viral illness.  -Started on indomethacin, but now discontinued due to low platelets. Unable to use colchicine due to risk of further bone marrow suppression patient without any chest pain now. Did not require any pain medications for 48 hours prior to discharge. 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 No evidence of bleeding at this point observed in the hospital.  his platelet count has hovered between 10 and 12 K. Given no bleeding he has not received any platelet transfusions.   -Started on high-dose steroids and IVIG as per heme-onc. Received 2 doses of IVIG. Platelet counts trended up and were at 23 at the side time of discharge. No signs of bleeding were noted. Discussed with patient that he will complete remaining 2 dose of high-dose Decadron at home. Patient to follow-up in hematology office for repeat blood count in 1 week     Anemia  -Unclear baseline. Hemoglobin nondistended although it is 7. No signs of bleeding ,no symptoms. Likely dilutional given IVIG and IV fluids. Thyroid nodule  -Currently undergoing work-up outpatient    Hyperglycemia  -In the setting of high-dose steroids. Was managed with sliding scaleHere. Acceptable level did not receive any meds at discharge. The patient expressed appropriate understanding of, and agreement with the discharge recommendations, medications, and plan. Consults this admission:  IP CONSULT TO CARDIOLOGY  IP CONSULT TO ONCOLOGY    Discharge Diagnosis:   NSTEMI (non-ST elevated myocardial infarction) ([facility] [address]  ITP  Anemia  Thyroid nodule  Hyperglycemia    Discharge Instruction:   Follow up appointments: Hematology, PCP, cardiology  Primary care physician: Hawa DAVE MD within 2 weeks  Diet: regular diet   Activity: activity as tolerated  Disposition: Discharged to:   [x]Home, [][facility], []SNF, []Acute Rehab, []Hospice   Condition on discharge: Stable  Labs and Tests to be Followed up as an outpatient by PCP or Specialist: Follow-up of further laboratory testing as per the thrombocytopenia including HIV, flow cytometry and BCR ABL mutation in hematology office. Repeat complete blood count within 1 week.     Discharge Medications:        Medication List        START taking these medications      Dexamethasone 20 MG Tabs  Take 40 mg by mouth daily (with breakfast) for 2 days     metoprolol tartrate 50 MG tablet  Commonly known as: LOPRESSOR  Take 1 tablet by mouth 2 times daily            CONTINUE taking these medications      omega-3 acid ethyl esters 1 g capsule  Commonly known as: LOVAZA     omeprazole 20 MG delayed release capsule  Commonly known as: PRILOSEC     Vascepa 1 g Caps capsule  Generic drug: Icosapent Ethyl            STOP taking these medications      buPROPion 150 MG extended release tablet  Commonly known as: WELLBUTRIN XL     dexlansoprazole 60 MG Cpdr delayed release capsule  Commonly known as: DEXILANT     ibuprofen 600 MG tablet  Commonly known as: ADVIL;MOTRIN     naproxen 500 MG tablet  Commonly known as: Naprosyn     sulfamethoxazole-trimethoprim 800-160 MG per tablet  Commonly known as: BACTRIM DS;SEPTRA DS     tobramycin 0.3 % ophthalmic solution  Commonly known as: TOBREX               Where to Get Your Medications        These medications were sent to [address] [facility] [address], [address] [place] [address]      Phone: [phone]   Dexamethasone 20 MG Tabs  metoprolol tartrate 50 MG tablet        Objective Findings at Discharge:   /88   Pulse 85   Temp 98.2 °F (36.8 °C) (Oral)   Resp 20   Ht 6' (1.829 m)   Wt 202 lb 2.6 oz (91.7 kg)   SpO2 97%   BMI 27.42 kg/m²       Physical Exam:   General: NAD  Eyes: EOMI  ENT: neck supple  Cardiovascular: Regular rate. Respiratory: Clear to auscultation  Gastrointestinal: Soft, non tender  Genitourinary: no suprapubic tenderness  Musculoskeletal: No edema  Skin: warm, dry  Neuro: Alert. Psych: Mood appropriate. Labs and Imaging   US ABDOMEN COMPLETE    Result Date: 1/27/2023  EXAMINATION: COMPLETE ABDOMINAL ULTRASOUND 1/27/2023 9:27 pm COMPARISON: CT chest done 01/25/2023. HISTORY: ORDERING SYSTEM PROVIDED HISTORY: rule out hepatosplenomegaly TECHNOLOGIST PROVIDED HISTORY: Reason for exam:->rule out hepatosplenomegaly FINDINGS: LIVER: The liver measures 19 cm in length and demonstrates diffusely increased echogenicity suggestive of hepatic steatosis. Multiple small simple appearing hepatic cysts, the largest measuring 1.2 cm in the peripheral right hepatic lobe. These likely represent benign cysts. No significant intrahepatic biliary ductal dilatation. Patent main portal vein with hepatopetal flow. The main portal vein measures 1.4 cm in diameter. BILIARY SYSTEM: Gallbladder is unremarkable without evidence of pericholecystic fluid, wall thickening or stones. Negative sonographic Arana's sign. Common bile duct is within normal limits measuring 5 mm. KIDNEYS: The kidneys are unremarkable in appearance without evidence of hydronephrosis. The right kidney measures 12.1 cm in length. The left kidney measures 12.1 cm in length. PANCREAS: Visualized portions of the pancreas are unremarkable. SPLEEN: The spleen is unremarkable in appearance. Spleen is within normal limits in size. IVC: The IVC is obscured by overlying bowel gas. AORTA: The aorta is obscured by overlying bowel gas. OTHER: No evidence of ascites. Hepatomegaly and hepatic steatosis. No splenomegaly. XR CHEST PORTABLE    Result Date: 1/25/2023  EXAMINATION: ONE XRAY VIEW OF THE CHEST 1/25/2023 7:03 pm COMPARISON: 12/13/2022 HISTORY: Acute chest pain and fever. FINDINGS: Cardiomediastinal silhouette at the upper limit of normal.  Possible emphysema. No acute airspace disease, pleural effusion, or pneumothorax. Evidence of chronic granulomatous disease. No acute abnormality. CTA PULMONARY W CONTRAST    Result Date: 1/25/2023  EXAMINATION: CTA OF THE CHEST 1/25/2023 8:13 pm TECHNIQUE: CTA of the chest was performed after the administration of 75 mL Isovue 370 intravenous contrast.  Multiplanar reformatted images are provided for review. MIP images are provided for review. Automated exposure control, iterative reconstruction, and/or weight based adjustment of the mA/kV was utilized to reduce the radiation dose to as low as reasonably achievable. COMPARISON: Correlation with concurrent chest radiograph HISTORY: Acute chest pain. FINDINGS: Image quality mildly degraded by motion artifact. Pulmonary Arteries: Pulmonary arteries are adequately opacified for evaluation. No intraluminal filling defect to suggest pulmonary embolism. Main pulmonary artery is normal in caliber. Mediastinum: Heart is not enlarged. Systemic and coronary atherosclerotic calcification.   At least moderate aortic valvular calcification.  No pericardial effusion.  Thoracic aorta is normal caliber.  No lymphadenopathy. Calcified mediastinal and hilar lymph nodes, right greater than left. Thyroid and esophagus are unremarkable. Lungs/pleura: Mild dependent atelectasis.  Mild scarring in both lungs.  No acute airspace disease or pleural effusion.  Scattered calcified granulomas. Upper Abdomen: Calcified granulomas within the spleen and liver.  Scattered hypodense liver lesions are likely benign. Soft Tissues/Bones: No acute abnormality.  Chronic fracture of the left 7th rib.     No pulmonary embolism or acute pulmonary abnormality.       CBC:   Recent Labs     01/27/23 0332 01/28/23 0327 01/29/23  0600   WBC 6.5 5.3 9.5   HGB 8.2* 7.7* 7.0*   PLT 12* 11* 23*     BMP:    Recent Labs     01/27/23 0332 01/28/23  0327    139   K 4.0 4.2    108   CO2 24 22   BUN 22 23   CREATININE 0.7* 0.7*   GLUCOSE 124* 213*       Lipids:   Lab Results   Component Value Date/Time    CHOL 109 01/26/2023 06:52 AM    HDL 32 01/26/2023 06:52 AM    TRIG 80 01/26/2023 06:52 AM     Hemoglobin A1C:   Lab Results   Component Value Date/Time    LABA1C 6.3 01/26/2023 06:52 AM       Troponin:   Lab Results   Component Value Date/Time    TROPONINT 0.022 01/27/2023 03:32 AM    TROPONINT 0.016 01/26/2023 11:57 PM    TROPONINT 0.033 01/26/2023 05:12 PM       UA:  Lab Results   Component Value Date/Time    NITRU NEGATIVE 01/26/2023 12:30 AM    COLORU YELLOW 01/26/2023 12:30 AM    WBCUA <0 01/26/2023 12:30 AM    RBCUA 0 01/26/2023 12:30 AM    BACTERIA FEW 01/26/2023 12:30 AM    CLARITYU CLEAR 01/26/2023 12:30 AM    SPECGRAV 1.010 01/26/2023 12:30 AM    LEUKOCYTESUR NEGATIVE 01/26/2023 12:30 AM    UROBILINOGEN 0.2 01/26/2023 12:30 AM    BILIRUBINUR NEGATIVE 01/26/2023 12:30 AM    BLOODU SMALL NUMBER OR AMOUNT OBSERVED 01/26/2023 12:30 AM    KETUA NEGATIVE 01/26/2023 12:30 AM         Time Spent Discharging patient 36 minutes    Electronically signed by Pipe ZUNIGA  Milagro Zimmerman MD on 1/29/2023 at 2:00 PM

## 2023-01-30 LAB
HIV SCREEN: NON REACTIVE
SMEAR REVIEW: NORMAL

## 2023-01-31 NOTE — PROGRESS NOTES
2/1/2023  Primary cardiologist: Dr. Mendel Chapa:   Katie Valladares  is an established 58 y.o.  male here for a follow up on hospital/ C      SUBJECTIVE/OBJECTIVE:  Katie Valladares is a 58 y.o. male with a history of chronic neck pain, anemia, thrombocytopenia, VHD- moderate AS      HPI :   Katie Valladares reports he was recently in [facility]. He was admitted with chest tightness- troponin non specific with peak of 0.033. He underwent LHC that showed no significant CAD and normal EF. Diagnosis of pericarditis. During hospitalization he was also noted to be anemic with thrombocytopenia. Hematology was asked to see patient as well. Since discharge he notes ongoing shortness of breath and fatigue. He tires more easily. Noted short of breath with walking from parking lot to the office today  Denies chest pain      . Review of Systems   Constitutional: Positive for malaise/fatigue. Negative for diaphoresis. HENT:  Positive for nosebleeds. Cardiovascular:  Negative for chest pain, claudication, dyspnea on exertion, irregular heartbeat, leg swelling, near-syncope, orthopnea, palpitations and paroxysmal nocturnal dyspnea. Respiratory:  Negative for shortness of breath. Skin:         Bruising   Neurological:  Negative for dizziness and light-headedness. Vitals:    02/01/23 0814   BP: 118/70   Site: Left Upper Arm   Position: Sitting   Cuff Size: Medium Adult   Pulse: 69   Weight: 200 lb (90.7 kg)   Height: 6' (1.829 m)     No flowsheet data found. Wt Readings from Last 3 Encounters:   02/01/23 200 lb (90.7 kg)   01/26/23 202 lb 2.6 oz (91.7 kg)   01/25/23 201 lb (91.2 kg)     Body mass index is 27.12 kg/m². Physical Exam  Vitals reviewed. HENT:      Head: Normocephalic and atraumatic. Eyes:      Extraocular Movements: Extraocular movements intact. Pupils: Pupils are equal, round, and reactive to light. Neck:      Vascular: No carotid bruit. Cardiovascular:      Rate and Rhythm: Normal rate and regular rhythm. Pulses: Normal pulses. Pulmonary:      Effort: Pulmonary effort is normal.      Breath sounds: Normal breath sounds. Abdominal:      General: There is no distension. Palpations: Abdomen is soft. Tenderness: There is no abdominal tenderness. Musculoskeletal:         General: Normal range of motion. Cervical back: No tenderness. Right lower leg: No edema. Left lower leg: No edema. Skin:     General: Skin is warm and dry. Capillary Refill: Capillary refill takes less than 2 seconds. Neurological:      General: No focal deficit present. Mental Status: He is alert and oriented to person, place, and time. Psychiatric:         Mood and Affect: Mood normal.         Behavior: Behavior normal.              Current Outpatient Medications   Medication Sig Dispense Refill    metoprolol tartrate (LOPRESSOR) 50 MG tablet Take 1 tablet by mouth 2 times daily 60 tablet 1    omega-3 acid ethyl esters (LOVAZA) 1 g capsule TAKE 2 CAPSULES BY MOUTH TWICE A DAY      omeprazole (PRILOSEC) 20 MG delayed release capsule TAKE 1 CAPSULE BY MOUTH EVERY DAY      Icosapent Ethyl (VASCEPA) 1 g CAPS capsule Vascepa 1 gram capsule (Patient not taking: Reported on 2/1/2023)       No current facility-administered medications for this visit. All pertinent data reviewed and discussed with patient  Echocardiogram 01/26/2023   Left ventricular systolic function is hyperdynamic. Ejection fraction is visually estimated at 65-70%. Moderate aortic stenosis is present. Mean gradient of 27 mmHG   EJ of 1.73 Cm2   No evidence of any pericardial effusion. Dilated Ascending Aorta is 4.2 Cm        ASSESSMENT/PLAN:      Pericarditis   Recently admitted  to the hospital with chest pain with mildly elevated troponins. LHC normal.  chest pain related to pericarditis  He was treated with IV steroids and Indocin however due to significant anemia and thrombocytopenia Indocin was discontinued.    Can try OTC acetaminophen for discomfort. We will continue with metoprolol 50 mg twice daily. Valvular heart disease  Echocardiogram with moderate aortic stenosis. Has ongoing shortness of breath which may be related to valvular heart disease most likely related to his significant anemia. Hemoglobin 7.0-he is to follow with hematology  Recommend repeat echocardiogram in 6 months. Tests ordered: Echocardiogram in 6 months with follow-up      Signed:  KANDACE Bentley CNP, 2/1/2023, 8:24 AM    An electronic signature was used to authenticate this note. Please note this report has been partially produced using speech recognition software and may contain errors related to that system including errors in grammar, punctuation, and spelling, as well as words and phrases that may be inappropriate. If there are any questions or concerns please feel free to contact the dictating provider for clarification.

## 2023-02-01 ENCOUNTER — OFFICE VISIT (OUTPATIENT)
Dept: CARDIOLOGY CLINIC | Age: 63
End: 2023-02-01
Payer: COMMERCIAL

## 2023-02-01 VITALS
BODY MASS INDEX: 27.09 KG/M2 | WEIGHT: 200 LBS | SYSTOLIC BLOOD PRESSURE: 118 MMHG | HEART RATE: 69 BPM | DIASTOLIC BLOOD PRESSURE: 70 MMHG | HEIGHT: 72 IN

## 2023-02-01 DIAGNOSIS — I38 VHD (VALVULAR HEART DISEASE): ICD-10-CM

## 2023-02-01 DIAGNOSIS — Z09 HOSPITAL DISCHARGE FOLLOW-UP: Primary | ICD-10-CM

## 2023-02-01 DIAGNOSIS — R07.2 PRECORDIAL PAIN: ICD-10-CM

## 2023-02-01 PROBLEM — I21.4 NSTEMI (NON-ST ELEVATED MYOCARDIAL INFARCTION) (HCC): Status: RESOLVED | Noted: 2023-01-26 | Resolved: 2023-02-01

## 2023-02-01 PROCEDURE — G8427 DOCREV CUR MEDS BY ELIG CLIN: HCPCS | Performed by: NURSE PRACTITIONER

## 2023-02-01 PROCEDURE — 99214 OFFICE O/P EST MOD 30 MIN: CPT | Performed by: NURSE PRACTITIONER

## 2023-02-01 PROCEDURE — 1036F TOBACCO NON-USER: CPT | Performed by: NURSE PRACTITIONER

## 2023-02-01 PROCEDURE — G8419 CALC BMI OUT NRM PARAM NOF/U: HCPCS | Performed by: NURSE PRACTITIONER

## 2023-02-01 PROCEDURE — 1111F DSCHRG MED/CURRENT MED MERGE: CPT | Performed by: NURSE PRACTITIONER

## 2023-02-01 PROCEDURE — G8484 FLU IMMUNIZE NO ADMIN: HCPCS | Performed by: NURSE PRACTITIONER

## 2023-02-01 PROCEDURE — 3017F COLORECTAL CA SCREEN DOC REV: CPT | Performed by: NURSE PRACTITIONER

## 2023-02-01 ASSESSMENT — ENCOUNTER SYMPTOMS
ORTHOPNEA: 0
SHORTNESS OF BREATH: 0
ROS SKIN COMMENTS: BRUISING

## 2023-02-01 NOTE — LETTER
Jacinto Chapa  1960  [id number]    Have you had any Chest Pain that is not new? - Yes  If Yes DO EKG - How does it feel - Pressure    How long does the pain last -  seconds    How long have you been having the pain - Day's   Did you take a    And did it relieve the pain -      DO EKG IF: Patient has a Heart Rate above 100 or below 40     CAD (Coronary Artery Disease) patient should have one on file every 6 months        Have you had any Shortness of Breath - Yes  If Yes - When on exertion    Have you had any dizziness - No  If Yes DO ORTHOSTATIC BP - when do you feel dizzy    How long does it last .        Sitting wait 5 minutes do supine (laying down) wait 5 minutes then do standing - log each in \"vitals\" area in Epic   Be sure to ask what symptoms they are having if they get dizzy while completing ortho stats such as: room spinning, nausea, etc.    Have you had any palpitations that are not new? - No  If Yes DO EKG - Do you feel your heart   How long does it last - . Is the patient on any of the following medications -   If Yes DO EKG - Needs done every 6 months    Do you have any edema - swelling in No    If Yes - CHECK TO SEE IF THE EDEMA IS PITTING  How long have they been having edema -   If Yes - Have they worn compression stockings   If they have worn compression stockings      Vein \"LEG PROBLEM Questionnaire\"  1. Do you have prominent leg veins? No   2. Do you have any skin discoloration? No  3. Do you have any healed or active sores? No  4. Do you have swelling of the legs? No  5. Do you have a family history of varicose veins? No  6. Does your profession involve pro-longed        standing or heavy lifting? No  7. Have you been fighting overweight problems? No  8. Do you have restless legs? No  9. Do you have any night time cramps? No  10.  Do you have any of the following in your legs:        None      When did you have your last labs drawn   Where did you have them done   What doctor ordered     If we do not have these labs you are retrieve these labs for these providers! Do you have a surgery or procedure scheduled in the near future - No  If Yes- DO EKG  If Yes - Who is the surgery with?    Phone number of physician   Fax number of physician   Type of surgery GIVE THIS INFORMATION TO OLIVIA SEO     Ask patient if they want to sign up for MyChart if they are not already signed up     Check to see if we have an E-MAIL on file for the patient     Check medication list thoroughly!!! AND RECONCILE OUTSIDE MEDICATIONS  If dose has changed change the entire order not just the [place] At check out add to every patient's \"wrap up\" the following dot phrase AFTERHOURSEDUCATION and ensure we explain this to our patients

## 2023-02-01 NOTE — PATIENT INSTRUCTIONS
Thank you for allowing us to care for you today! We want to ensure we can follow your treatment plan and we strive to give you the best outcomes and experience possible. If you ever have a life threatening emergency and call 911 - for an ambulance (EMS)   Our providers can only care for you at:   [facility] or [facility]. Even if you have someone take you or you drive yourself we can only care for you in a [facility]. Our providers are not setup at the other healthcare locations! [address] [facility] Locations - No appointment necessary. Sites open Monday to Friday. Call your preferred location for test preparation, business   hours and other information you need. [employer] [facility] [employer]. [address] [facility], [address] [facility]  Phone: [phone] [facility]  [address]  [address] [facility], [address]  Phone: [phone]       Please be informed that if you contact our office outside of normal business hours the physician on call cannot help with any scheduling or rescheduling issues, procedure instruction questions or any type of medication issue. We advise you for any urgent/emergency that you go to the nearest emergency room! PLEASE CALL OUR OFFICE DURING NORMAL BUSINESS HOURS    Monday - Friday   8 am to 5 pm    [place] [address]: [phone]    [place]:  [phone]    **It is YOUR responsibilty to bring medication bottles and/or updated medication list to [address] [place] [address].  This will allow us to better serve you and all your healthcare needs**

## 2023-02-02 ENCOUNTER — TELEPHONE (OUTPATIENT)
Dept: ONCOLOGY | Age: 63
End: 2023-02-02

## 2023-02-02 NOTE — TELEPHONE ENCOUNTER
Have been attempting to reach patient for results and to bring in for urgent labs however he has been hospitalized. Called Observation unit at [facility] and spoke to Kyle Hogue with preliminary flow results showing CMML and peripheral smear showing possible rare blasts raising concern for AMML. Gave my mobile phone call-back number for the hospitalist, confirmed that hematology/oncology has been consulted there and will also receive this information. Will continue to follow for final flow results.     Gladis Quigley PA-C

## 2023-02-04 LAB
CV B1 NAB TITR SER NT: NORMAL {TITER}
CV B2 NAB TITR SER NT: NORMAL {TITER}
CV B3 NAB TITR SER NT: NORMAL {TITER}
CV B4 NAB TITR SER NT: NORMAL {TITER}
CV B5 NAB TITR SER NT: NORMAL {TITER}
CV B6 NAB TITR SER NT: NORMAL {TITER}

## 2023-05-11 NOTE — ED NOTES
Pt is awake, requesting to go to BR. Ambulated to BR without difficulty and returned to bed. Denies chest discomfort. Denies any other complaints or needs at this time. SR up x2, call light in reach.   Cardiac monitor is SR.       Nayana Chase RN  01/26/23 7494 She can use NovoLog FlexPen, not the pen fill and not the [employer]  It would be the same dosage as the Humalog